# Patient Record
Sex: FEMALE | Race: WHITE | Employment: FULL TIME | URBAN - METROPOLITAN AREA
[De-identification: names, ages, dates, MRNs, and addresses within clinical notes are randomized per-mention and may not be internally consistent; named-entity substitution may affect disease eponyms.]

---

## 2017-03-01 LAB — 25(OH)D3+25(OH)D2 SERPL-MCNC: 25.9 NG/ML (ref 30–100)

## 2018-02-21 ENCOUNTER — OFFICE VISIT (OUTPATIENT)
Dept: INTERNAL MEDICINE CLINIC | Age: 38
End: 2018-02-21

## 2018-02-21 VITALS
WEIGHT: 145.6 LBS | HEIGHT: 66 IN | HEART RATE: 76 BPM | RESPIRATION RATE: 20 BRPM | DIASTOLIC BLOOD PRESSURE: 60 MMHG | BODY MASS INDEX: 23.4 KG/M2 | OXYGEN SATURATION: 98 % | TEMPERATURE: 98.4 F | SYSTOLIC BLOOD PRESSURE: 90 MMHG

## 2018-02-21 DIAGNOSIS — H61.23 BILATERAL IMPACTED CERUMEN: ICD-10-CM

## 2018-02-21 DIAGNOSIS — Z00.00 WELL ADULT EXAM: Primary | ICD-10-CM

## 2018-02-21 DIAGNOSIS — E78.89 ELEVATED HDL: ICD-10-CM

## 2018-02-21 PROBLEM — F32.A MILD DEPRESSION: Status: RESOLVED | Noted: 2018-02-21 | Resolved: 2018-02-21

## 2018-02-21 PROBLEM — F32.A MILD DEPRESSION: Status: ACTIVE | Noted: 2018-02-21

## 2018-02-21 NOTE — PROGRESS NOTES
Comprehensive Physical Examination    Ernst Ferguson is a 40 y.o. female. she presents for a comprehensive physical examination. The patient reports feeling generally well today. She has no acute complaints. On review of her previously performed lab work, she does have very mild elevation of cholesterol. Also, she has some borderline vitamin D levels. Otherwise, she is doing well. Patient Active Problem List    Diagnosis Date Noted    Mild depression ([address] 2018    Active labor at term 2014    Breech presentation 2014     delivery delivered 2014    Hyperlipidemia 2012    Recurrent cold sores     Abnormal Pap smear of cervix      Current Outpatient Prescriptions   Medication Sig Dispense Refill    APRI 0.15-0.03 mg tab TAKE 1 TABLET BY MOUTH EVERY DAY  3    oxycodone-acetaminophen (PERCOCET) 5-325 mg per tablet Take 1-2 tablets by mouth every four (4) hours as needed for Pain. 30 tablet 0    ibuprofen (MOTRIN) 600 mg tablet Take 1 tablet by mouth every six (6) hours as needed for Pain. 30 tablet 6    oxyCODONE-acetaminophen (PERCOCET) 5-325 mg per tablet Take 1-2 Tabs by mouth every four (4) hours as needed for Pain. 15 Tab 0    ibuprofen (MOTRIN) 600 mg tablet Take 1 Tab by mouth every six (6) hours as needed for Pain. 30 Tab 6    prenatal multivit-ca-min-fe-fa Tab Take 1 Tab by mouth daily.        No Known Allergies  Past Medical History:   Diagnosis Date    Abnormal Pap smear of cervix     Colpo, client states normal since     Depression     GERD (gastroesophageal reflux disease)     HPV in female     Hyperlipidemia     high HDL, borderling LDL on previous testing    Recurrent cold sores      Past Surgical History:   Procedure Laterality Date    BREAST SURGERY PROCEDURE UNLISTED      left breast  - lumpectomy (negative)    BREAST SURGERY PROCEDURE UNLISTED  2011    right breast - staple placed    HX GYN  2012 colposcopy    HX GYN  13    D&C - missed     HX HEENT      WISDOM TEETH    HX OTHER SURGICAL      wisdom teeth     HX OTHER SURGICAL      D&C      Family History   Problem Relation Age of Onset    Hypertension Father     Coronary Artery Disease Father 54     stents x2    Cancer Brother 22     tongue    Cancer Maternal Grandmother      late onset breast CA    Hypertension Paternal Grandmother     Malignant Hyperthermia Neg Hx     Pseudocholinesterase Deficiency Neg Hx     Delayed Awakening Neg Hx     Post-op Nausea/Vomiting Neg Hx      Social History   Substance Use Topics    Smoking status: Never Smoker    Smokeless tobacco: Never Used    Alcohol use 3.0 oz/week     5 Glasses of wine per week        Health Maintenance   Topic Date Due    Influenza Age 5 to Adult  2017    PAP AKA CERVICAL CYTOLOGY  2018    DTaP/Tdap/Td series (2 - Td) 2024         Review of Systems   Constitutional: Negative. Respiratory: Negative. Cardiovascular: Negative. Gastrointestinal: Negative. Visit Vitals    BP 90/60 (BP 1 Location: Right arm, BP Patient Position: Sitting)    Pulse 76    Temp 98.4 °F (36.9 °C) (Oral)    Resp 20    Ht 5' 6.18\" (1.681 m)    Wt 145 lb 9.6 oz (66 kg)    LMP 2018    SpO2 98%    BMI 23.37 kg/m2       Physical Exam   Constitutional: No distress. HENT:   Mouth/Throat: No oropharyngeal exudate. Bilateral cerumen impactions   Neck: Normal range of motion. Cardiovascular: Normal rate and regular rhythm. Exam reveals no friction rub. Pulmonary/Chest: Effort normal and breath sounds normal. She has no wheezes. Abdominal: Soft. Bowel sounds are normal.   Lymphadenopathy:     She has no cervical adenopathy. ASSESSMENT/PLAN  Diagnoses and all orders for this visit:    1. Well adult exam    2. Bilateral impacted cerumen  -     REMOVE IMPACTED EAR WAX    3.  Elevated HDL  -     LIPID PANEL  -     METABOLIC PANEL, COMPREHENSIVE        Follow-up Disposition:  Return in about 1 year (around 2/21/2019) for Full Physical - 30 minutes appointment.

## 2018-08-22 ENCOUNTER — OFFICE VISIT (OUTPATIENT)
Dept: FAMILY MEDICINE CLINIC | Age: 38
End: 2018-08-22

## 2018-08-22 VITALS
RESPIRATION RATE: 16 BRPM | HEIGHT: 66 IN | OXYGEN SATURATION: 100 % | BODY MASS INDEX: 25.58 KG/M2 | SYSTOLIC BLOOD PRESSURE: 128 MMHG | DIASTOLIC BLOOD PRESSURE: 82 MMHG | WEIGHT: 159.2 LBS | TEMPERATURE: 98.9 F | HEART RATE: 68 BPM

## 2018-08-22 DIAGNOSIS — H61.23 BILATERAL IMPACTED CERUMEN: Primary | ICD-10-CM

## 2018-08-22 NOTE — PROGRESS NOTES
Subjective:     Brigitte Lezama is a 41 y.o. female who presents for evaluation of a plugged ear. She has noticed bilateral symptoms 1 week ago. There is a prior history of cerumen impaction. Patient denies ear pain. Patient denies hearing loss. Objective:     Visit Vitals    /82 (BP 1 Location: Left arm, BP Patient Position: Sitting)    Pulse 68    Temp 98.9 °F (37.2 °C) (Oral)    Resp 16    Ht 5' 6.18\" (1.681 m)    Wt 159 lb 3.2 oz (72.2 kg)    LMP 08/22/2018 (Exact Date)    SpO2 100%    Breastfeeding No    BMI 25.56 kg/m2       General: alert, cooperative, no distress   Right Ear: ceruminosis noted. Left Ear:  ceruminosis noted. After removal: right ear normal, ceruminosis noted, ranjana on the left. Oropharynx:   normal.   Neck:  supple, symmetrical, trachea midline and no adenopathy. Assessment/Plan:     Cerumen Impaction, without otitis externa. 1. Cerumen removed by flushing. 2. Care instructions given. 3. Home treatment: Debrox bid  4. Followup as needed. ICD-10-CM ICD-9-CM    1. Bilateral impacted cerumen H61.23 380.4 REMOVE IMPACTED EAR WAX   .

## 2018-08-22 NOTE — PROGRESS NOTES
Chief Complaint   Patient presents with    Ear Pain     Bilateral ear pain for a week. Now with a headache and feels cloudy. Flushed in March     Bilateral ear flushes per Dr. Kimbrouhg Portal. Tolerated well.

## 2018-11-01 ENCOUNTER — OFFICE VISIT (OUTPATIENT)
Dept: FAMILY MEDICINE CLINIC | Age: 38
End: 2018-11-01

## 2018-11-01 VITALS
BODY MASS INDEX: 23.91 KG/M2 | HEART RATE: 90 BPM | DIASTOLIC BLOOD PRESSURE: 83 MMHG | RESPIRATION RATE: 16 BRPM | TEMPERATURE: 97.3 F | HEIGHT: 66 IN | OXYGEN SATURATION: 99 % | WEIGHT: 148.8 LBS | SYSTOLIC BLOOD PRESSURE: 129 MMHG

## 2018-11-01 DIAGNOSIS — Z23 ENCOUNTER FOR IMMUNIZATION: ICD-10-CM

## 2018-11-01 DIAGNOSIS — J11.1 INFLUENZA: ICD-10-CM

## 2018-11-01 DIAGNOSIS — J02.9 SORE THROAT: Primary | ICD-10-CM

## 2018-11-01 LAB
S PYO AG THROAT QL: NEGATIVE
VALID INTERNAL CONTROL?: YES

## 2018-11-01 NOTE — PROGRESS NOTES
Chief Complaint   Patient presents with    Cold Symptoms     Cough and sore throat offf and on for a week. No fever or feeling bad, traveling alot on a plane.     Immunization/Injection     Patient here for placement of Influenza vaccine

## 2018-11-01 NOTE — PROGRESS NOTES
HISTORY OF PRESENT ILLNESS  Jeremiah Espino is a 41 y.o. female. Patient reports intermittent sore throat for 3 weeks. She started with sore throat, which progressed to cough and yellow congestion. This improved, but the dry cough and sore throat has lingered. It seems worse at night and first thing in the morning. No fever. Congestion is clear now. She is not taking anything for allergies at this time. Visit Vitals  /83 (BP 1 Location: Left arm, BP Patient Position: Sitting)   Pulse 90   Temp 97.3 °F (36.3 °C) (Oral)   Resp 16   Ht 5' 6.18\" (1.681 m)   Wt 148 lb 12.8 oz (67.5 kg)   LMP 10/17/2018   SpO2 99%   BMI 23.89 kg/m²       HPI    Review of Systems   HENT: Positive for sore throat. Respiratory: Positive for cough. Physical Exam   Constitutional: She is oriented to person, place, and time. She appears well-developed and well-nourished. HENT:   Head: Normocephalic. Right Ear: Hearing, tympanic membrane, external ear and ear canal normal.   Left Ear: Hearing, tympanic membrane, external ear and ear canal normal.   Nose: Mucosal edema present. Mouth/Throat: Uvula is midline and mucous membranes are normal. Posterior oropharyngeal erythema (postpharyngeal cobblestoning and redness) present. Neck: Normal range of motion. Neck supple. Cardiovascular: Normal rate, regular rhythm and normal heart sounds. Pulmonary/Chest: Effort normal and breath sounds normal.   Lymphadenopathy:     She has cervical adenopathy (mild). Neurological: She is alert and oriented to person, place, and time. Skin: Skin is warm and dry. Psychiatric: She has a normal mood and affect. Results for orders placed or performed in visit on 11/01/18   AMB POC RAPID STREP A   Result Value Ref Range    VALID INTERNAL CONTROL POC Yes     Group A Strep Ag Negative Negative       ASSESSMENT and PLAN    ICD-10-CM ICD-9-CM    1. Sore throat J02.9 462 AMB POC RAPID STREP A   2. Influenza J11.1 487.1    3.  Encounter for immunization Z23 V03.89 INFLUENZA VIRUS VAC QUAD,SPLIT,PRESV FREE SYRINGE IM     Orders Placed This Encounter    Influenza virus vaccine (QUADRIVALENT PF SYRINGE) ([id number])    AMB POC RAPID STREP A   zyrtec and flonase

## 2019-06-06 ENCOUNTER — OFFICE VISIT (OUTPATIENT)
Dept: INTERNAL MEDICINE CLINIC | Age: 39
End: 2019-06-06

## 2019-06-06 VITALS
WEIGHT: 148 LBS | TEMPERATURE: 98.5 F | DIASTOLIC BLOOD PRESSURE: 88 MMHG | HEART RATE: 83 BPM | SYSTOLIC BLOOD PRESSURE: 110 MMHG | HEIGHT: 66 IN | RESPIRATION RATE: 19 BRPM | BODY MASS INDEX: 23.78 KG/M2 | OXYGEN SATURATION: 97 %

## 2019-06-06 DIAGNOSIS — Z00.00 WELL ADULT EXAM: Primary | ICD-10-CM

## 2019-06-06 NOTE — PROGRESS NOTES
Comprehensive Physical Examination    Carroll Posada is a 41 y.o. female. she presents for a comprehensive physical examination. No complaints. She feels well. She has been exercising and eating better. She still has some issues with earwax. Discussed the use of Debrox. She will continue this.       Patient Active Problem List    Diagnosis Date Noted    Bilateral impacted cerumen 2018    Active labor at term 2014    Breech presentation 2014     delivery delivered 2014    Hyperlipidemia 2012    Recurrent cold sores     Abnormal Pap smear of cervix      Current Outpatient Medications   Medication Sig Dispense Refill    APRI 0.15-0.03 mg tab TAKE 1 TABLET BY MOUTH EVERY DAY  3     No Known Allergies  Past Medical History:   Diagnosis Date    Abnormal Pap smear of cervix     Colpo, client states normal since     Depression     GERD (gastroesophageal reflux disease)     HPV in female     Hyperlipidemia     high HDL, borderling LDL on previous testing    Recurrent cold sores      Past Surgical History:   Procedure Laterality Date    BREAST SURGERY PROCEDURE UNLISTED      left breast  - lumpectomy (negative)    BREAST SURGERY PROCEDURE UNLISTED  2011    right breast - staple placed    HX GYN  2012    colposcopy    HX GYN  13    D&C - missed     HX HEENT      WISDOM TEETH    HX OTHER SURGICAL      wisdom teeth     HX OTHER SURGICAL      D&C      Family History   Problem Relation Age of Onset    Hypertension Father     Coronary Artery Disease Father 54        stents x2    Cancer Brother 25        tongue    Cancer Maternal Grandmother         late onset breast CA    Hypertension Paternal Grandmother     Malignant Hyperthermia Neg Hx     Pseudocholinesterase Deficiency Neg Hx     Delayed Awakening Neg Hx     Post-op Nausea/Vomiting Neg Hx      Social History     Tobacco Use    Smoking status: Never Smoker  Smokeless tobacco: Never Used   Substance Use Topics    Alcohol use: Yes     Alcohol/week: 3.0 oz     Types: 5 Glasses of wine per week        Health Maintenance   Topic Date Due    Influenza Age 5 to Adult  08/01/2019    PAP AKA CERVICAL CYTOLOGY  03/08/2020    DTaP/Tdap/Td series (2 - Td) 01/01/2024    Pneumococcal 0-64 years  Aged Out         Review of Systems   Constitutional: Negative. Respiratory: Negative. Cardiovascular: Negative. Gastrointestinal: Negative. Visit Vitals  /88 (BP 1 Location: Right arm, BP Patient Position: Sitting)   Pulse 83   Temp 98.5 °F (36.9 °C) (Oral)   Resp 19   Ht 5' 6.18\" (1.681 m)   Wt 148 lb (67.1 kg)   LMP 05/30/2019   SpO2 97%   BMI 23.76 kg/m²       Physical Exam   Constitutional: She is oriented to person, place, and time and well-developed, well-nourished, and in no distress. No distress. HENT:   Mouth/Throat: Oropharynx is clear and moist.   Neck: Neck supple. Cardiovascular: Normal rate and regular rhythm. Pulmonary/Chest: Effort normal and breath sounds normal. She has no wheezes. Abdominal: Soft. Bowel sounds are normal.   Musculoskeletal: Normal range of motion. Neurological: She is alert and oriented to person, place, and time. ASSESSMENT/PLAN  Diagnoses and all orders for this visit:    1. Well adult exam  -     CBC WITH AUTOMATED DIFF  -     LIPID PANEL  -     METABOLIC PANEL, COMPREHENSIVE  -     TSH 3RD GENERATION        Follow-up and Dispositions    · Return in about 1 year (around 6/6/2020) for Full Physical - 30 minutes appointment.

## 2019-07-01 ENCOUNTER — TELEPHONE (OUTPATIENT)
Dept: INTERNAL MEDICINE CLINIC | Age: 39
End: 2019-07-01

## 2019-07-01 NOTE — TELEPHONE ENCOUNTER
Spoke with patient, stitches in pinky finger on Saturday [facility]. Throbbing, feels a little numb and tingling. Confirmed appt 07/02/19 @ 8:15. Pt verbalized understanding.

## 2019-07-02 ENCOUNTER — DOCUMENTATION ONLY (OUTPATIENT)
Dept: INTERNAL MEDICINE CLINIC | Age: 39
End: 2019-07-02

## 2019-07-02 ENCOUNTER — OFFICE VISIT (OUTPATIENT)
Dept: INTERNAL MEDICINE CLINIC | Age: 39
End: 2019-07-02

## 2019-07-02 VITALS
OXYGEN SATURATION: 99 % | WEIGHT: 149 LBS | HEART RATE: 76 BPM | RESPIRATION RATE: 19 BRPM | DIASTOLIC BLOOD PRESSURE: 80 MMHG | HEIGHT: 66 IN | TEMPERATURE: 98.1 F | BODY MASS INDEX: 23.95 KG/M2 | SYSTOLIC BLOOD PRESSURE: 110 MMHG

## 2019-07-02 DIAGNOSIS — S69.91XA FINGER INJURY, RIGHT, INITIAL ENCOUNTER: Primary | ICD-10-CM

## 2019-07-02 NOTE — PROGRESS NOTES
Pt scheduled with Dr. Joe Singh on 7/2/19 @ 3 pm in the [facility] office. Time and instructions given to pt. Pt verbalized understanding.

## 2019-07-02 NOTE — PROGRESS NOTES
Acute Care Note    Daune Councilman is 41 y.o. female. she presents for evaluation of Wound Check    The patient comes for evaluation after he had seen in the emergency room on . She had broken a mild can cut her fifth digit on the palmar surface near the bend of the proximal interphalangeal joint. She had the area stitched at the time and was discharged home. Since that time, the patient has had ongoing numbness of the fifth digit. She is also had difficulty bending and even moving the digit. She presents with concern of this injury. Prior to Admission medications    Medication Sig Start Date End Date Taking? Authorizing Provider   APRI 0.15-0.03 mg tab TAKE 1 TABLET BY MOUTH EVERY DAY 16  Yes Provider, Historical         Patient Active Problem List   Diagnosis Code    Recurrent cold sores B00.1    Abnormal Pap smear of cervix R87.619    Hyperlipidemia E78.5    Active labor at term [address] presentation O32. 1XX0     delivery delivered O82    Bilateral impacted cerumen H61.23         Review of Systems   Constitutional: Negative. Cardiovascular: Negative. Neurological: Positive for sensory change. Visit Vitals  /80 (BP 1 Location: Right arm, BP Patient Position: Sitting)   Pulse 76   Temp 98.1 °F (36.7 °C) (Oral)   Resp 19   Ht 5' 6.18\" (1.681 m)   Wt 149 lb (67.6 kg)   LMP 2019   SpO2 99%   BMI 23.92 kg/m²       Physical Exam   Constitutional: No distress. Musculoskeletal:   Swelling of the right fifth digit. Sutures are in place with dried serous drainage surrounding. The patient is unable to move the digit more than a very slight bend. She also has tingling present at the distal portion of the finger with numbness associated. ASSESSMENT/PLAN  Diagnoses and all orders for this visit:    1.  Finger injury, right, initial encounter -discussed with the patient, given the extent of her loss of function, I am concerned regarding the possibility of a tendon injury versus nerve injury to the finger. I am referring her immediately to a hand surgeon. I will ask her to be seen today for this. The patient endorses understanding. I referred to Dr. Art Chao at [facility] orthopedics. Advised the patient to call back or return to office if symptoms worsen/change/persist.   Discussed expected course/resolution/complications of diagnosis in detail with patient. Medication risks/benefits/costs/interactions/alternatives discussed with patient. The patient was given an after visit summary which includes diagnoses, current medications, & vitals. They expressed understanding with the diagnosis and plan.

## 2019-07-03 LAB
ALBUMIN SERPL-MCNC: 4.2 G/DL (ref 3.5–5.5)
ALBUMIN/GLOB SERPL: 1.5 {RATIO} (ref 1.2–2.2)
ALP SERPL-CCNC: 35 IU/L (ref 39–117)
ALT SERPL-CCNC: 11 IU/L (ref 0–32)
AST SERPL-CCNC: 11 IU/L (ref 0–40)
BASOPHILS # BLD AUTO: 0 X10E3/UL (ref 0–0.2)
BASOPHILS NFR BLD AUTO: 1 %
BILIRUB SERPL-MCNC: 0.4 MG/DL (ref 0–1.2)
BUN SERPL-MCNC: 14 MG/DL (ref 6–20)
BUN/CREAT SERPL: 19 (ref 9–23)
CALCIUM SERPL-MCNC: 9.3 MG/DL (ref 8.7–10.2)
CHLORIDE SERPL-SCNC: 101 MMOL/L (ref 96–106)
CHOLEST SERPL-MCNC: 247 MG/DL (ref 100–199)
CO2 SERPL-SCNC: 25 MMOL/L (ref 20–29)
CREAT SERPL-MCNC: 0.75 MG/DL (ref 0.57–1)
EOSINOPHIL # BLD AUTO: 0.1 X10E3/UL (ref 0–0.4)
EOSINOPHIL NFR BLD AUTO: 2 %
ERYTHROCYTE [DISTWIDTH] IN BLOOD BY AUTOMATED COUNT: 11.8 % (ref 12.3–15.4)
GLOBULIN SER CALC-MCNC: 2.8 G/DL (ref 1.5–4.5)
GLUCOSE SERPL-MCNC: 90 MG/DL (ref 65–99)
HCT VFR BLD AUTO: 40.4 % (ref 34–46.6)
HDLC SERPL-MCNC: 116 MG/DL
HGB BLD-MCNC: 13.1 G/DL (ref 11.1–15.9)
IMM GRANULOCYTES # BLD AUTO: 0 X10E3/UL (ref 0–0.1)
IMM GRANULOCYTES NFR BLD AUTO: 0 %
LDLC SERPL CALC-MCNC: 109 MG/DL (ref 0–99)
LYMPHOCYTES # BLD AUTO: 1.6 X10E3/UL (ref 0.7–3.1)
LYMPHOCYTES NFR BLD AUTO: 26 %
MCH RBC QN AUTO: 29.2 PG (ref 26.6–33)
MCHC RBC AUTO-ENTMCNC: 32.4 G/DL (ref 31.5–35.7)
MCV RBC AUTO: 90 FL (ref 79–97)
MONOCYTES # BLD AUTO: 0.4 X10E3/UL (ref 0.1–0.9)
MONOCYTES NFR BLD AUTO: 7 %
NEUTROPHILS # BLD AUTO: 4 X10E3/UL (ref 1.4–7)
NEUTROPHILS NFR BLD AUTO: 64 %
PLATELET # BLD AUTO: 389 X10E3/UL (ref 150–450)
POTASSIUM SERPL-SCNC: 4.1 MMOL/L (ref 3.5–5.2)
PROT SERPL-MCNC: 7 G/DL (ref 6–8.5)
RBC # BLD AUTO: 4.49 X10E6/UL (ref 3.77–5.28)
SODIUM SERPL-SCNC: 141 MMOL/L (ref 134–144)
TRIGL SERPL-MCNC: 108 MG/DL (ref 0–149)
TSH SERPL DL<=0.005 MIU/L-ACNC: 1.89 UIU/ML (ref 0.45–4.5)
VLDLC SERPL CALC-MCNC: 22 MG/DL (ref 5–40)
WBC # BLD AUTO: 6.1 X10E3/UL (ref 3.4–10.8)

## 2020-10-15 ENCOUNTER — OFFICE VISIT (OUTPATIENT)
Dept: INTERNAL MEDICINE CLINIC | Age: 40
End: 2020-10-15
Payer: COMMERCIAL

## 2020-10-15 VITALS
WEIGHT: 148.6 LBS | SYSTOLIC BLOOD PRESSURE: 122 MMHG | TEMPERATURE: 97.6 F | OXYGEN SATURATION: 100 % | BODY MASS INDEX: 23.88 KG/M2 | DIASTOLIC BLOOD PRESSURE: 70 MMHG | HEIGHT: 66 IN | RESPIRATION RATE: 14 BRPM | HEART RATE: 93 BPM

## 2020-10-15 DIAGNOSIS — E78.00 PURE HYPERCHOLESTEROLEMIA: ICD-10-CM

## 2020-10-15 DIAGNOSIS — Z00.00 WELL ADULT EXAM: Primary | ICD-10-CM

## 2020-10-15 PROCEDURE — 99395 PREV VISIT EST AGE 18-39: CPT | Performed by: INTERNAL MEDICINE

## 2020-10-15 NOTE — PROGRESS NOTES
Comprehensive Physical Examination    Balbir Parish is a 45 y.o. female. she presents for a comprehensive physical examination. Feeling well, no complaints. She has seen gynecology and dermatology. No issues noted.       She has had her flu shot     Patient Active Problem List    Diagnosis Date Noted    Bilateral impacted cerumen 2018    Active labor at term 2014    Breech presentation 2014     delivery delivered 2014    Hyperlipidemia 2012    Recurrent cold sores     Abnormal Pap smear of cervix      Current Outpatient Medications   Medication Sig Dispense Refill    APRI 0.15-0.03 mg tab TAKE 1 TABLET BY MOUTH EVERY DAY  3     No Known Allergies  Past Medical History:   Diagnosis Date    Abnormal Pap smear of cervix     Colpo, client states normal since     Depression     GERD (gastroesophageal reflux disease)     HPV in female     Hyperlipidemia     high HDL, borderling LDL on previous testing    Recurrent cold sores      Past Surgical History:   Procedure Laterality Date    BREAST SURGERY PROCEDURE UNLISTED      left breast  - lumpectomy (negative)    BREAST SURGERY PROCEDURE UNLISTED  2011    right breast - staple placed    HX GYN  2012    colposcopy    HX GYN  13    D&C - missed     HX HEENT      WISDOM TEETH    HX OTHER SURGICAL      wisdom teeth     HX OTHER SURGICAL      D&C      Family History   Problem Relation Age of Onset    Hypertension Father     Coronary Artery Disease Father 54        stents x2    Cancer Brother 25        tongue    Cancer Maternal Grandmother         late onset breast CA    Hypertension Paternal Grandmother     Malignant Hyperthermia Neg Hx     Pseudocholinesterase Deficiency Neg Hx     Delayed Awakening Neg Hx     Post-op Nausea/Vomiting Neg Hx      Social History     Tobacco Use    Smoking status: Never Smoker    Smokeless tobacco: Never Used   Substance Use Topics  Alcohol use: Yes     Alcohol/week: 5.0 standard drinks     Types: 5 Glasses of wine per week        Health Maintenance   Topic Date Due    PAP AKA CERVICAL CYTOLOGY  03/08/2020    Flu Vaccine (1) 09/01/2020    DTaP/Tdap/Td series (2 - Td) 01/01/2024    Pneumococcal 0-64 years  Aged Out         Review of Systems   Constitutional: Negative. Respiratory: Negative. Cardiovascular: Negative. Gastrointestinal: Negative. Visit Vitals  /70 (BP 1 Location: Left arm, BP Patient Position: Sitting)   Pulse 93   Temp 97.6 °F (36.4 °C) (Temporal)   Resp 14   Ht 5' 6.18\" (1.681 m)   Wt 148 lb 9.6 oz (67.4 kg)   SpO2 100%   BMI 23.85 kg/m²       Physical Exam  Constitutional:       Appearance: Normal appearance. Cardiovascular:      Rate and Rhythm: Normal rate and regular rhythm. Pulses: Normal pulses. Heart sounds: Normal heart sounds. Pulmonary:      Effort: Pulmonary effort is normal.      Breath sounds: Normal breath sounds. Neurological:      Mental Status: She is alert. ASSESSMENT/PLAN  Diagnoses and all orders for this visit:    1. Well adult exam  -     CBC WITH AUTOMATED DIFF  -     METABOLIC PANEL, COMPREHENSIVE  -     TSH 3RD GENERATION    2. Pure hypercholesterolemia  -     LIPID PANEL      Follow-up and Dispositions    · Return in about 1 year (around 10/15/2021).

## 2020-10-15 NOTE — PROGRESS NOTES
Chief Complaint   Patient presents with    Complete Physical     Reviewed record in preparation for visit and have obtained necessary documentation. Identified pt with two pt identifiers(name and ). Health Maintenance Due   Topic    PAP AKA CERVICAL CYTOLOGY     Flu Vaccine (1)         Chief Complaint   Patient presents with    Complete Physical        Wt Readings from Last 3 Encounters:   10/15/20 148 lb 9.6 oz (67.4 kg)   19 149 lb (67.6 kg)   19 148 lb (67.1 kg)     Temp Readings from Last 3 Encounters:   10/15/20 97.6 °F (36.4 °C) (Temporal)   19 98.1 °F (36.7 °C) (Oral)   19 98.5 °F (36.9 °C) (Oral)     BP Readings from Last 3 Encounters:   10/15/20 122/70   19 110/80   19 110/88     Pulse Readings from Last 3 Encounters:   10/15/20 93   19 76   19 83           Learning Assessment:  :     Learning Assessment 2018   PRIMARY LEARNER Patient   HIGHEST LEVEL OF EDUCATION - PRIMARY LEARNER  4 YEARS OF COLLEGE   BARRIERS PRIMARY LEARNER NONE   PRIMARY LANGUAGE ENGLISH   LEARNER PREFERENCE PRIMARY DEMONSTRATION   ANSWERED BY Patient   RELATIONSHIP SELF       Depression Screening:  :     3 most recent PHQ Screens 2019   Little interest or pleasure in doing things Not at all   Feeling down, depressed, irritable, or hopeless Not at all   Total Score PHQ 2 0       Fall Risk Assessment:  :     No flowsheet data found. Abuse Screening:  :     Abuse Screening Questionnaire 2018   Do you ever feel afraid of your partner? N   Are you in a relationship with someone who physically or mentally threatens you? N   Is it safe for you to go home?  Y       Coordination of Care Questionnaire:  :     1) Have you been to an emergency room, urgent care clinic since your last visit? no   Hospitalized since your last visit? no             2) Have you seen or consulted any other health care providers outside of [address] [place] [address] since your last visit? no (Include any pap smears or colon screenings in this section.)    3) Do you have an Advance Directive on file? no    4) Are you interested in receiving information on Advance Directives? NO      Patient is accompanied by self I have received verbal consent from Trey Bennett to discuss any/all medical information while they are present in the room. Reviewed record  In preparation for visit and have obtained necessary documentation.

## 2020-12-30 LAB
ALBUMIN SERPL-MCNC: 4.2 G/DL (ref 3.8–4.8)
ALBUMIN/GLOB SERPL: 1.4 {RATIO} (ref 1.2–2.2)
ALP SERPL-CCNC: 37 IU/L (ref 39–117)
ALT SERPL-CCNC: 11 IU/L (ref 0–32)
AST SERPL-CCNC: 13 IU/L (ref 0–40)
BASOPHILS # BLD AUTO: 0 X10E3/UL (ref 0–0.2)
BASOPHILS NFR BLD AUTO: 1 %
BILIRUB SERPL-MCNC: 0.3 MG/DL (ref 0–1.2)
BUN SERPL-MCNC: 14 MG/DL (ref 6–20)
BUN/CREAT SERPL: 18 (ref 9–23)
CALCIUM SERPL-MCNC: 9.3 MG/DL (ref 8.7–10.2)
CHLORIDE SERPL-SCNC: 101 MMOL/L (ref 96–106)
CHOLEST SERPL-MCNC: 265 MG/DL (ref 100–199)
CO2 SERPL-SCNC: 24 MMOL/L (ref 20–29)
CREAT SERPL-MCNC: 0.76 MG/DL (ref 0.57–1)
EOSINOPHIL # BLD AUTO: 0.1 X10E3/UL (ref 0–0.4)
EOSINOPHIL NFR BLD AUTO: 1 %
ERYTHROCYTE [DISTWIDTH] IN BLOOD BY AUTOMATED COUNT: 11.8 % (ref 11.7–15.4)
GLOBULIN SER CALC-MCNC: 2.9 G/DL (ref 1.5–4.5)
GLUCOSE SERPL-MCNC: 96 MG/DL (ref 65–99)
HCT VFR BLD AUTO: 40.7 % (ref 34–46.6)
HDLC SERPL-MCNC: 116 MG/DL
HGB BLD-MCNC: 13 G/DL (ref 11.1–15.9)
IMM GRANULOCYTES # BLD AUTO: 0 X10E3/UL (ref 0–0.1)
IMM GRANULOCYTES NFR BLD AUTO: 0 %
LDLC SERPL CALC-MCNC: 131 MG/DL (ref 0–99)
LYMPHOCYTES # BLD AUTO: 1.5 X10E3/UL (ref 0.7–3.1)
LYMPHOCYTES NFR BLD AUTO: 26 %
MCH RBC QN AUTO: 29.5 PG (ref 26.6–33)
MCHC RBC AUTO-ENTMCNC: 31.9 G/DL (ref 31.5–35.7)
MCV RBC AUTO: 92 FL (ref 79–97)
MONOCYTES # BLD AUTO: 0.5 X10E3/UL (ref 0.1–0.9)
MONOCYTES NFR BLD AUTO: 8 %
NEUTROPHILS # BLD AUTO: 3.7 X10E3/UL (ref 1.4–7)
NEUTROPHILS NFR BLD AUTO: 64 %
PLATELET # BLD AUTO: 383 X10E3/UL (ref 150–450)
POTASSIUM SERPL-SCNC: 3.9 MMOL/L (ref 3.5–5.2)
PROT SERPL-MCNC: 7.1 G/DL (ref 6–8.5)
RBC # BLD AUTO: 4.41 X10E6/UL (ref 3.77–5.28)
SODIUM SERPL-SCNC: 138 MMOL/L (ref 134–144)
TRIGL SERPL-MCNC: 110 MG/DL (ref 0–149)
TSH SERPL DL<=0.005 MIU/L-ACNC: 2.24 UIU/ML (ref 0.45–4.5)
VLDLC SERPL CALC-MCNC: 18 MG/DL (ref 5–40)
WBC # BLD AUTO: 5.8 X10E3/UL (ref 3.4–10.8)

## 2020-12-31 ENCOUNTER — OFFICE VISIT (OUTPATIENT)
Dept: INTERNAL MEDICINE CLINIC | Age: 40
End: 2020-12-31
Payer: COMMERCIAL

## 2020-12-31 ENCOUNTER — TELEPHONE (OUTPATIENT)
Dept: INTERNAL MEDICINE CLINIC | Age: 40
End: 2020-12-31

## 2020-12-31 VITALS
BODY MASS INDEX: 23.98 KG/M2 | DIASTOLIC BLOOD PRESSURE: 84 MMHG | SYSTOLIC BLOOD PRESSURE: 132 MMHG | HEART RATE: 89 BPM | RESPIRATION RATE: 20 BRPM | HEIGHT: 66 IN | OXYGEN SATURATION: 98 % | WEIGHT: 149.2 LBS | TEMPERATURE: 97.3 F

## 2020-12-31 DIAGNOSIS — B02.9 HERPES ZOSTER WITHOUT COMPLICATION: Primary | ICD-10-CM

## 2020-12-31 PROCEDURE — 99213 OFFICE O/P EST LOW 20 MIN: CPT | Performed by: INTERNAL MEDICINE

## 2020-12-31 NOTE — PROGRESS NOTES
HISTORY OF PRESENT ILLNESS  Bret Schrader is a 45 y.o. female. Rash   The history is provided by the patient (went to [facility], dx with shingles, given acyclovir. She's concerned about the dx). This is a new problem. The current episode started 2 days ago. The problem has not changed since onset. The problem is associated with an unknown factor. There has been no fever. The rash is present on the left upper leg. The pain is mild. The pain has been fluctuating since onset. Associated symptoms include itching and pain. Pertinent negatives include no blisters and no weeping. Risk factors: none. She has tried steroid cream for the symptoms. The treatment provided no relief. Review of Systems   Constitutional: Negative for chills, fever and malaise/fatigue. Musculoskeletal: Negative for myalgias. Skin: Positive for itching and rash. Physical Exam  Vitals signs and nursing note reviewed. Constitutional:       General: She is not in acute distress. Appearance: She is not ill-appearing. Skin:     General: Skin is warm and dry. Neurological:      Mental Status: She is alert. Psychiatric:         Behavior: Behavior normal.         ASSESSMENT and PLAN  Diagnoses and all orders for this visit:    1.  Herpes zoster without complication  - Continue current regimen of prescription and / or OTC medications , may use OTC pain meds if needed

## 2021-01-21 ENCOUNTER — TELEPHONE (OUTPATIENT)
Dept: INTERNAL MEDICINE CLINIC | Age: 41
End: 2021-01-21

## 2021-01-21 NOTE — TELEPHONE ENCOUNTER
Patient has sent a [employer] message 3 different times and has not gotten a response. Wants to know if she needs to come in for additional testing. Please respond.

## 2021-12-01 ENCOUNTER — OFFICE VISIT (OUTPATIENT)
Dept: INTERNAL MEDICINE CLINIC | Age: 41
End: 2021-12-01
Payer: COMMERCIAL

## 2021-12-01 VITALS
HEART RATE: 81 BPM | WEIGHT: 143 LBS | TEMPERATURE: 97.1 F | DIASTOLIC BLOOD PRESSURE: 90 MMHG | HEIGHT: 66 IN | OXYGEN SATURATION: 99 % | SYSTOLIC BLOOD PRESSURE: 145 MMHG | RESPIRATION RATE: 16 BRPM | BODY MASS INDEX: 22.98 KG/M2

## 2021-12-01 DIAGNOSIS — Z00.00 WELL ADULT EXAM: Primary | ICD-10-CM

## 2021-12-01 DIAGNOSIS — R03.0 ELEVATED BLOOD PRESSURE READING: ICD-10-CM

## 2021-12-01 DIAGNOSIS — F32.9 REACTIVE DEPRESSION: ICD-10-CM

## 2021-12-01 PROCEDURE — 93000 ELECTROCARDIOGRAM COMPLETE: CPT | Performed by: INTERNAL MEDICINE

## 2021-12-01 PROCEDURE — 99396 PREV VISIT EST AGE 40-64: CPT | Performed by: INTERNAL MEDICINE

## 2021-12-01 RX ORDER — AMLODIPINE BESYLATE 5 MG/1
5 TABLET ORAL DAILY
Qty: 30 TABLET | Refills: 3 | Status: SHIPPED | OUTPATIENT
Start: 2021-12-01 | End: 2022-02-15

## 2021-12-01 NOTE — PROGRESS NOTES
Comprehensive Physical Examination    John Au is a 37 y.o. female. she presents for a comprehensive physical examination. Elevated BP, no chest pain. Feeling well. She has noted some elevated blood pressure for the past 6 months. Noted at recurrent [facility] visits. She exercises regularly and notes having a good diet low in sodium. She does have a history of hypertension and heart disease in her family. She has had no other symptoms. Of note, she does have some anxiety/depression regarding her home situation. She has a step son from whom she and her  are currently estranged. She notes that this is upsetting to her and that she would like to have a time to talk about it with a counselor. Referral will be made today.          Patient Active Problem List    Diagnosis Date Noted    Bilateral impacted cerumen 2018    Active labor at term 2014    Breech presentation 2014     delivery delivered 2014    Hyperlipidemia 2012    Recurrent cold sores     Abnormal Pap smear of cervix      Current Outpatient Medications   Medication Sig Dispense Refill    APRI 0.15-0.03 mg tab TAKE 1 TABLET BY MOUTH EVERY DAY  3     No Known Allergies  Past Medical History:   Diagnosis Date    Abnormal Pap smear of cervix     Colpo, client states normal since     Depression     GERD (gastroesophageal reflux disease)     HPV in female     Hyperlipidemia     high HDL, borderling LDL on previous testing    Recurrent cold sores      Past Surgical History:   Procedure Laterality Date    HX GYN  2012    colposcopy    HX GYN  13    D&C - missed     HX HEENT      WISDOM TEETH    HX OTHER SURGICAL      wisdom teeth     HX OTHER SURGICAL      D&C     MT BREAST SURGERY PROCEDURE UNLISTED      left breast  - lumpectomy (negative)    MT BREAST SURGERY PROCEDURE UNLISTED  2011    right breast - staple placed     Family History Problem Relation Age of Onset    Hypertension Father     Coronary Art Dis Father 54        stents x2    Cancer Brother 22        tongue    Cancer Maternal Grandmother         late onset breast CA    Hypertension Paternal Grandmother     Malignant Hyperthermia Neg Hx     Pseudocholinesterase Deficiency Neg Hx     Delayed Awakening Neg Hx     Post-op Nausea/Vomiting Neg Hx      Social History     Tobacco Use    Smoking status: Never Smoker    Smokeless tobacco: Never Used   Substance Use Topics    Alcohol use: Yes     Alcohol/week: 5.0 standard drinks     Types: 5 Glasses of wine per week        Health Maintenance   Topic Date Due    Hepatitis C Screening  Never done    COVID-19 Vaccine (1) Never done    Cervical cancer screen  Never done    Flu Vaccine (1) 09/01/2021    DTaP/Tdap/Td series (2 - Td or Tdap) 01/01/2024    Lipid Screen  12/29/2025    Pneumococcal 0-64 years  Aged Out         Review of Systems   Constitutional: Negative. Respiratory: Negative. Cardiovascular: Negative. Gastrointestinal: Negative. Visit Vitals  BP (!) 145/90   Pulse 81   Temp 97.1 °F (36.2 °C) (Temporal)   Resp 16   Ht 5' 6\" (1.676 m)   Wt 143 lb (64.9 kg)   LMP 11/08/2021   SpO2 99%   BMI 23.08 kg/m²       Physical Exam  Constitutional:       General: She is not in acute distress. Cardiovascular:      Rate and Rhythm: Normal rate and regular rhythm. Pulmonary:      Effort: Pulmonary effort is normal.      Breath sounds: Normal breath sounds. No wheezing. Abdominal:      General: Bowel sounds are normal.      Palpations: Abdomen is soft. Musculoskeletal:         General: Normal range of motion. Cervical back: Neck supple. Neurological:      Mental Status: She is alert and oriented to person, place, and time. ASSESSMENT/PLAN  Diagnoses and all orders for this visit:    1. Well adult exam    2.  Elevated blood pressure reading - We will check labs today, follow up with pressures at home. Advised she also begin amlodipine. She will do so today. -     AMB POC EKG ROUTINE W/ 12 LEADS, INTER & REP  -     LIPID PANEL; Future  -     CBC WITH AUTOMATED DIFF; Future  -     TSH 3RD GENERATION; Future  -     METABOLIC PANEL, COMPREHENSIVE; Future  -     amLODIPine (NORVASC) 5 mg tablet; Take 1 Tablet by mouth daily. 3. Reactive depression  -     REFERRAL TO PSYCHOLOGY    Follow-up and Dispositions    · Return in about 1 month (around 1/1/2022).

## 2021-12-01 NOTE — PROGRESS NOTES
Verified name and birth date for privacy precautions. Chart reviewed in preparation for today's visit. Chief Complaint   Patient presents with    Complete Physical          Health Maintenance Due   Topic    Hepatitis C Screening     COVID-19 Vaccine (1)    Cervical cancer screen     Flu Vaccine (1)         Wt Readings from Last 3 Encounters:   12/01/21 143 lb (64.9 kg)   12/31/20 149 lb 3.2 oz (67.7 kg)   10/15/20 148 lb 9.6 oz (67.4 kg)     Temp Readings from Last 3 Encounters:   12/01/21 97.1 °F (36.2 °C) (Temporal)   12/31/20 97.3 °F (36.3 °C) (Temporal)   10/15/20 97.6 °F (36.4 °C) (Temporal)     BP Readings from Last 3 Encounters:   12/01/21 (!) 149/104   12/31/20 132/84   10/15/20 122/70     Pulse Readings from Last 3 Encounters:   12/01/21 81   12/31/20 89   10/15/20 93         Learning Assessment:  :     Learning Assessment 8/22/2018   PRIMARY LEARNER Patient   HIGHEST LEVEL OF EDUCATION - PRIMARY LEARNER  4 YEARS OF COLLEGE   BARRIERS PRIMARY LEARNER NONE   PRIMARY LANGUAGE ENGLISH   LEARNER PREFERENCE PRIMARY DEMONSTRATION   ANSWERED BY Patient   RELATIONSHIP SELF       Depression Screening:  :     3 most recent PHQ Screens 12/1/2021   Little interest or pleasure in doing things Not at all   Feeling down, depressed, irritable, or hopeless Not at all   Total Score PHQ 2 0       Fall Risk Assessment:  :     No flowsheet data found. Abuse Screening:  :     Abuse Screening Questionnaire 12/1/2021 2/21/2018   Do you ever feel afraid of your partner? N N   Are you in a relationship with someone who physically or mentally threatens you? N N   Is it safe for you to go home?  Henery Flavors

## 2022-01-11 ENCOUNTER — OFFICE VISIT (OUTPATIENT)
Dept: INTERNAL MEDICINE CLINIC | Age: 42
End: 2022-01-11
Payer: COMMERCIAL

## 2022-01-11 VITALS
HEART RATE: 75 BPM | OXYGEN SATURATION: 98 % | RESPIRATION RATE: 14 BRPM | DIASTOLIC BLOOD PRESSURE: 80 MMHG | WEIGHT: 142.6 LBS | TEMPERATURE: 98.4 F | HEIGHT: 66 IN | BODY MASS INDEX: 22.92 KG/M2 | SYSTOLIC BLOOD PRESSURE: 120 MMHG

## 2022-01-11 DIAGNOSIS — I10 ESSENTIAL HYPERTENSION: Primary | ICD-10-CM

## 2022-01-11 DIAGNOSIS — F32.9 REACTIVE DEPRESSION: ICD-10-CM

## 2022-01-11 PROCEDURE — 99213 OFFICE O/P EST LOW 20 MIN: CPT | Performed by: INTERNAL MEDICINE

## 2022-01-11 NOTE — PROGRESS NOTES
Follow Up Visit    Heydi Castillo is a 37 y.o. female. she presents for Hypertension    Cardiovascular Review  The patient has hypertension. She reports taking medications (amlodipine) as instructed, no medication side effects noted, no chest pain on exertion, no dyspnea on exertion. Diet and Lifestyle: generally follows a low fat low cholesterol diet, generally follows a low sodium diet, exercises regularly. Blood pressures at home have been in normal range. Lab review: labs reviewed and discussed with patient. She has also tried to address the impact of stress on her blood pressure. She is feeling calmer but we discussed the need to follow up with the counselor to ensure things to not worsen. Patient Active Problem List   Diagnosis Code    Recurrent cold sores B00.1    Abnormal Pap smear of cervix R87.619    Hyperlipidemia E78.5    Active labor at term [id number]    Breech presentation O32. 1XX0     delivery delivered O82    Bilateral impacted cerumen H61.23         Prior to Admission medications    Medication Sig Start Date End Date Taking? Authorizing Provider   amLODIPine (NORVASC) 5 mg tablet Take 1 Tablet by mouth daily. 21  Yes Roscoe Cabrera MD   APRI 0.15-0.03 mg tab TAKE 1 TABLET BY MOUTH EVERY DAY 16  Yes Provider, Historical         Health Maintenance   Topic Date Due    Hepatitis C Screening  Never done    COVID-19 Vaccine (1) Never done    Cervical cancer screen  Never done    DTaP/Tdap/Td series (2 - Td or Tdap) 2024    Lipid Screen  2026    Flu Vaccine  Completed    Pneumococcal 0-64 years  Aged Out       Review of Systems   Constitutional: Negative. HENT: Negative. Respiratory: Negative for cough. Cardiovascular: Negative for chest pain and palpitations. Gastrointestinal: Negative. Musculoskeletal: Negative. All other systems reviewed and are negative.           Visit Vitals  /80 (BP 1 Location: Left arm, BP Patient Position: Sitting, BP Cuff Size: Adult)   Pulse 75   Temp 98.4 °F (36.9 °C) (Temporal)   Resp 14   Ht 5' 6\" (1.676 m)   Wt 142 lb 9.6 oz (64.7 kg)   LMP 01/05/2022 (Approximate)   SpO2 98%   BMI 23.02 kg/m²       Physical Exam  Constitutional:       Appearance: Normal appearance. She is well-developed. Cardiovascular:      Rate and Rhythm: Normal rate and regular rhythm. Pulmonary:      Effort: Pulmonary effort is normal.      Breath sounds: Normal breath sounds. Neurological:      Mental Status: She is alert. ASSESSMENT/PLAN    Diagnoses and all orders for this visit:    1. Essential hypertension - Pressures are improved with amlodipine. She feels better and has changed her diet and lifestyle. We will continue medications as ordered    2. Reactive depression - Advised follow up with psychology for now.   She will call for an appointment.   -     REFERRAL TO PSYCHOLOGY

## 2022-01-11 NOTE — PROGRESS NOTES
Chief Complaint   Patient presents with    Hypertension     Reviewed record in preparation for visit and have obtained necessary documentation. Identified pt with two pt identifiers(name and ). Health Maintenance Due   Topic    Hepatitis C Screening     COVID-19 Vaccine (1)    Cervical cancer screen          Chief Complaint   Patient presents with    Hypertension        Wt Readings from Last 3 Encounters:   22 142 lb 9.6 oz (64.7 kg)   21 143 lb (64.9 kg)   20 149 lb 3.2 oz (67.7 kg)     Temp Readings from Last 3 Encounters:   22 98.4 °F (36.9 °C) (Temporal)   21 97.1 °F (36.2 °C) (Temporal)   20 97.3 °F (36.3 °C) (Temporal)     BP Readings from Last 3 Encounters:   22 120/80   21 (!) 145/90   20 132/84     Pulse Readings from Last 3 Encounters:   22 75   21 81   20 89           Learning Assessment:  :     Learning Assessment 2018   PRIMARY LEARNER Patient   HIGHEST LEVEL OF EDUCATION - PRIMARY LEARNER  4 YEARS OF COLLEGE   BARRIERS PRIMARY LEARNER NONE   PRIMARY LANGUAGE ENGLISH   LEARNER PREFERENCE PRIMARY DEMONSTRATION   ANSWERED BY Patient   RELATIONSHIP SELF       Depression Screening:  :     3 most recent PHQ Screens 2022   Little interest or pleasure in doing things Not at all   Feeling down, depressed, irritable, or hopeless Not at all   Total Score PHQ 2 0       Fall Risk Assessment:  :     No flowsheet data found. Abuse Screening:  :     Abuse Screening Questionnaire 2022   Do you ever feel afraid of your partner? N N N   Are you in a relationship with someone who physically or mentally threatens you? N N N   Is it safe for you to go home?  Y Y Y       Coordination of Care Questionnaire:  :     1) Have you been to an emergency room, urgent care clinic since your last visit? no   Hospitalized since your last visit? no             2) Have you seen or consulted any other health care providers outside of [address] [place] [address] since your last visit? no  (Include any pap smears or colon screenings in this section.)    3) Do you have an Advance Directive on file? no    4) Are you interested in receiving information on Advance Directives? NO      Patient is accompanied by self I have received verbal consent from Lisbet Butts to discuss any/all medical information while they are present in the room. Chief Complaint   Patient presents with    Hypertension     Reviewed record in preparation for visit and have obtained necessary documentation. Identified pt with two pt identifiers(name and ). Health Maintenance Due   Topic    Hepatitis C Screening     COVID-19 Vaccine (1)    Cervical cancer screen          Chief Complaint   Patient presents with    Hypertension        Wt Readings from Last 3 Encounters:   22 142 lb 9.6 oz (64.7 kg)   21 143 lb (64.9 kg)   20 149 lb 3.2 oz (67.7 kg)     Temp Readings from Last 3 Encounters:   22 98.4 °F (36.9 °C) (Temporal)   21 97.1 °F (36.2 °C) (Temporal)   20 97.3 °F (36.3 °C) (Temporal)     BP Readings from Last 3 Encounters:   22 120/80   21 (!) 145/90   20 132/84     Pulse Readings from Last 3 Encounters:   22 75   21 81   20 89           Learning Assessment:  :     Learning Assessment 2018   PRIMARY LEARNER Patient   HIGHEST LEVEL OF EDUCATION - PRIMARY LEARNER  4 YEARS OF COLLEGE   BARRIERS PRIMARY LEARNER NONE   PRIMARY LANGUAGE ENGLISH   LEARNER PREFERENCE PRIMARY DEMONSTRATION   ANSWERED BY Patient   RELATIONSHIP SELF       Depression Screening:  :     3 most recent PHQ Screens 2022   Little interest or pleasure in doing things Not at all   Feeling down, depressed, irritable, or hopeless Not at all   Total Score PHQ 2 0       Fall Risk Assessment:  :     No flowsheet data found.     Abuse Screening:  :     Abuse Screening Questionnaire 2022 2/21/2018   Do you ever feel afraid of your partner? N N N   Are you in a relationship with someone who physically or mentally threatens you? N N N   Is it safe for you to go home? Y Y Y       Coordination of Care Questionnaire:  :     1) Have you been to an emergency room, urgent care clinic since your last visit? no   Hospitalized since your last visit? no             2) Have you seen or consulted any other health care providers outside of [address] [place] [address] since your last visit? no  (Include any pap smears or colon screenings in this section.)    3) Do you have an Advance Directive on file? no    4) Are you interested in receiving information on Advance Directives? NO      Patient is accompanied by self I have received verbal consent from Shree Elkins to discuss any/all medical information while they are present in the room. Reviewed record  In preparation for visit and have obtained necessary documentation.

## 2022-03-17 ENCOUNTER — OFFICE VISIT (OUTPATIENT)
Dept: INTERNAL MEDICINE CLINIC | Age: 42
End: 2022-03-17
Payer: COMMERCIAL

## 2022-03-17 VITALS
DIASTOLIC BLOOD PRESSURE: 82 MMHG | WEIGHT: 145 LBS | SYSTOLIC BLOOD PRESSURE: 130 MMHG | OXYGEN SATURATION: 98 % | HEART RATE: 76 BPM | TEMPERATURE: 98.6 F | RESPIRATION RATE: 14 BRPM | HEIGHT: 66 IN | BODY MASS INDEX: 23.3 KG/M2

## 2022-03-17 DIAGNOSIS — I10 ESSENTIAL HYPERTENSION: Primary | ICD-10-CM

## 2022-03-17 PROCEDURE — 99213 OFFICE O/P EST LOW 20 MIN: CPT | Performed by: INTERNAL MEDICINE

## 2022-03-17 NOTE — PROGRESS NOTES
Chief Complaint   Patient presents with    Hypertension     Reviewed record in preparation for visit and have obtained necessary documentation. Identified pt with two pt identifiers(name and ). Health Maintenance Due   Topic    Hepatitis C Screening     Cervical cancer screen          Chief Complaint   Patient presents with    Hypertension        Wt Readings from Last 3 Encounters:   22 145 lb (65.8 kg)   22 142 lb 9.6 oz (64.7 kg)   21 143 lb (64.9 kg)     Temp Readings from Last 3 Encounters:   22 98.6 °F (37 °C) (Temporal)   22 98.4 °F (36.9 °C) (Temporal)   21 97.1 °F (36.2 °C) (Temporal)     BP Readings from Last 3 Encounters:   22 130/82   22 120/80   21 (!) 145/90     Pulse Readings from Last 3 Encounters:   22 76   22 75   21 81           Learning Assessment:  :     Learning Assessment 2018   PRIMARY LEARNER Patient   HIGHEST LEVEL OF EDUCATION - PRIMARY LEARNER  4 YEARS OF COLLEGE   BARRIERS PRIMARY LEARNER NONE   PRIMARY LANGUAGE ENGLISH   LEARNER PREFERENCE PRIMARY DEMONSTRATION   ANSWERED BY Patient   RELATIONSHIP SELF       Depression Screening:  :     3 most recent PHQ Screens 3/17/2022   Little interest or pleasure in doing things Not at all   Feeling down, depressed, irritable, or hopeless Not at all   Total Score PHQ 2 0       Fall Risk Assessment:  :     No flowsheet data found. Abuse Screening:  :     Abuse Screening Questionnaire 3/17/2022 2022 2021 2018   Do you ever feel afraid of your partner? N N N N   Are you in a relationship with someone who physically or mentally threatens you? N N N N   Is it safe for you to go home?  Y Y Y Y       Coordination of Care Questionnaire:  :     1) Have you been to an emergency room, urgent care clinic since your last visit? no   Hospitalized since your last visit? no             2) Have you seen or consulted any other health care providers outside of [facility] since your last visit? no  (Include any pap smears or colon screenings in this section.)    3) Do you have an Advance Directive on file? no    4) Are you interested in receiving information on Advance Directives? NO      Patient is accompanied by self I have received verbal consent from Keisha Malloy to discuss any/all medical information while they are present in the room. Reviewed record  In preparation for visit and have obtained necessary documentation.

## 2022-03-19 PROBLEM — H61.23 BILATERAL IMPACTED CERUMEN: Status: ACTIVE | Noted: 2018-02-21

## 2022-04-05 NOTE — PROGRESS NOTES
Follow Up Visit    Rafa Vasquez is a 37 y.o. female. she presents for Hypertension    Cardiovascular Review  The patient has hypertension. She reports taking medications as instructed, no medication side effects noted. Diet and Lifestyle: generally follows a low fat low cholesterol diet, generally follows a low sodium diet. Lab review: labs reviewed and discussed with patient. She is on amlodipine now and tolerating this. Patient Active Problem List   Diagnosis Code    Recurrent cold sores B00.1    Abnormal Pap smear of cervix R87.619    Hyperlipidemia E78.5    Active labor at term [id number]    Breech presentation O32. 1XX0     delivery delivered O82    Bilateral impacted cerumen H61.23         Prior to Admission medications    Medication Sig Start Date End Date Taking? Authorizing Provider   amLODIPine (NORVASC) 5 mg tablet TAKE 1 TABLET BY MOUTH EVERY DAY 2/15/22  Yes Devi Cavazos MD   APRI 0.15-0.03 mg tab TAKE 1 TABLET BY MOUTH EVERY DAY 16  Yes Provider, Historical         Health Maintenance   Topic Date Due    Hepatitis C Screening  Never done    Cervical cancer screen  Never done    Depression Monitoring  2023    DTaP/Tdap/Td series (2 - Td or Tdap) 2024    Lipid Screen  2026    Flu Vaccine  Completed    COVID-19 Vaccine  Completed    Pneumococcal 0-64 years  Aged Out       Review of Systems   Constitutional: Negative. Respiratory: Negative. Cardiovascular: Negative. Gastrointestinal: Negative. Visit Vitals  /82 (BP 1 Location: Left arm, BP Patient Position: Sitting, BP Cuff Size: Adult)   Pulse 76   Temp 98.6 °F (37 °C) (Temporal)   Resp 14   Ht 5' 6\" (1.676 m)   Wt 145 lb (65.8 kg)   SpO2 98%   BMI 23.40 kg/m²       Physical Exam  Constitutional:       Appearance: She is well-developed. Cardiovascular:      Rate and Rhythm: Normal rate and regular rhythm.    Pulmonary:      Effort: Pulmonary effort is normal. Breath sounds: Normal breath sounds. ASSESSMENT/PLAN    Diagnoses and all orders for this visit:    1. Essential hypertension -Continue medications as ordered. She is tolerating medications well. Pressures controlled.

## 2022-06-16 ENCOUNTER — VIRTUAL VISIT (OUTPATIENT)
Dept: INTERNAL MEDICINE CLINIC | Age: 42
End: 2022-06-16
Payer: COMMERCIAL

## 2022-06-16 DIAGNOSIS — I10 ESSENTIAL HYPERTENSION: Primary | ICD-10-CM

## 2022-06-16 PROCEDURE — 99213 OFFICE O/P EST LOW 20 MIN: CPT | Performed by: INTERNAL MEDICINE

## 2022-06-16 NOTE — LETTER
6/16/2022 3:24 PM    Ms. [facility] [id number]      To whom it may concern:      I have seen the above named patient, Jeet Domingo in virtual consultation today, June 16th, 2022. She reports having tested positive for COVID on June 11th, 2022. She has completed her 5 day quarantine and appears to have had a clinical recovery. No further quarantine or treatment is necessary.             Sincerely,        Kailee Mederos MD

## 2022-07-05 NOTE — PROGRESS NOTES
Kareem Aguirre is a 37 y.o. female who was seen by synchronous (real-time) audio-video technology on 2022. She confirmed that, for purposes of billing, this is a virtual visit with her provider for which we will submit a claim for reimbursement to her insurance company. She is aware that she will be responsible for any copays, coinsurance amounts or other amounts not covered by her insurance company. Do you accept - YES    This visit was completed was completed virtually using [employer]        Subjective:   Kareem Aguirre was seen for follow up    Cardiovascular Review  The patient has hypertension. She reports taking medications as instructed, no medication side effects noted, no chest pain on exertion, no dyspnea on exertion. Diet and Lifestyle: generally follows a low fat low cholesterol diet, generally follows a low sodium diet, exercises regularly, nonsmoker. Lab review: labs reviewed and discussed with patient. Prior to Admission medications    Medication Sig Start Date End Date Taking?  Authorizing Provider   amLODIPine (NORVASC) 5 mg tablet TAKE 1 TABLET BY MOUTH EVERY DAY 2/15/22  Yes Susy Rojas MD   APRI 0.15-0.03 mg tab TAKE 1 TABLET BY MOUTH EVERY DAY 16  Yes Provider, Historical       No Known Allergies    Patient Active Problem List    Diagnosis Date Noted    Bilateral impacted cerumen 2018    Active labor at term 2014    Breech presentation 2014     delivery delivered 2014    Hyperlipidemia 2012    Recurrent cold sores     Abnormal Pap smear of cervix      Current Outpatient Medications   Medication Sig Dispense Refill    amLODIPine (NORVASC) 5 mg tablet TAKE 1 TABLET BY MOUTH EVERY DAY 90 Tablet 1    APRI 0.15-0.03 mg tab TAKE 1 TABLET BY MOUTH EVERY DAY  3     No Known Allergies  Past Medical History:   Diagnosis Date    Abnormal Pap smear of cervix     Colpo, client states normal since     Depression     GERD (gastroesophageal reflux disease)     HPV in female     Hyperlipidemia     high HDL, borderling LDL on previous testing    Recurrent cold sores      Past Surgical History:   Procedure Laterality Date    HX GYN  2012    colposcopy    HX GYN  13    D&C - missed     HX HEENT      WISDOM TEETH    HX OTHER SURGICAL      wisdom teeth     HX OTHER SURGICAL      D&C     TX BREAST SURGERY PROCEDURE UNLISTED      left breast  - lumpectomy (negative)    TX BREAST SURGERY PROCEDURE UNLISTED  2011    right breast - staple placed     Family History   Problem Relation Age of Onset    Hypertension Father     Coronary Art Dis Father 54        stents x2    Cancer Brother 22        tongue    Cancer Maternal Grandmother         late onset breast CA    Hypertension Paternal Grandmother     Malignant Hyperthermia Neg Hx     Pseudocholinesterase Deficiency Neg Hx     Delayed Awakening Neg Hx     Post-op Nausea/Vomiting Neg Hx      Social History     Tobacco Use    Smoking status: Never Smoker    Smokeless tobacco: Never Used   Substance Use Topics    Alcohol use: Yes     Alcohol/week: 5.0 standard drinks     Types: 5 Glasses of wine per week          ROS - per HPI      Objective:     General: alert, cooperative, no distress   Mental  status: pe mental status_general use: normal mood, behavior, speech, dress, motor activity, and thought processes, able to follow commands   Eyes: EOM intact, normal sclera   Mouth: mucous membranes moist   Neck: no visualized mass   Resp: PULM - obs findings: normal effort and no respiratory distress   Neuro: neuro - obs: no gross deficits   Musculoskeletal: normal ROM of neck and normal gait w/o ataxia   Skin: skin exam: no discoloration or lesions of concern on visible areas   Psychiatric: normal affect, no hallucinations           Assessment & Plan:   1. Essential hypertension - Discussed with the patient to continue the current medication course. They will continue healthy habits and follow up as directed for further health maintenance. The patient agrees with and understands the plan of care. All questions have been answered. Due to this being a TeleHealth evaluation, many elements of the physical examination are unable to be assessed. Pursuant to the emergency declaration under the [facility] [place] [address] [facility] authority and the [employer] and [employer] Act, this Virtual  Visit was conducted, with patient's consent, to reduce the patient's risk of exposure to COVID-19 and provide continuity of care for an established patient. Services were provided through a video synchronous discussion virtually to substitute for in-person clinic visit. We discussed the expected course, resolution and complications of the diagnosis(es) in detail. Medication risks, benefits, costs, interactions, and alternatives were discussed as indicated. I advised her to contact the office if her condition worsens, changes or fails to improve as anticipated. She expressed understanding with the diagnosis(es) and plan.      Checo Knowles MD

## 2022-08-15 DIAGNOSIS — R03.0 ELEVATED BLOOD PRESSURE READING: ICD-10-CM

## 2022-08-17 RX ORDER — AMLODIPINE BESYLATE 5 MG/1
TABLET ORAL
Qty: 90 TABLET | Refills: 0 | Status: SHIPPED | OUTPATIENT
Start: 2022-08-17

## 2022-11-19 DIAGNOSIS — R03.0 ELEVATED BLOOD PRESSURE READING: ICD-10-CM

## 2022-11-21 RX ORDER — AMLODIPINE BESYLATE 5 MG/1
TABLET ORAL
Qty: 90 TABLET | Refills: 0 | Status: SHIPPED | OUTPATIENT
Start: 2022-11-21

## 2022-12-05 ENCOUNTER — OFFICE VISIT (OUTPATIENT)
Dept: INTERNAL MEDICINE CLINIC | Age: 42
End: 2022-12-05
Payer: COMMERCIAL

## 2022-12-05 VITALS
RESPIRATION RATE: 14 BRPM | HEART RATE: 74 BPM | WEIGHT: 148.2 LBS | DIASTOLIC BLOOD PRESSURE: 80 MMHG | TEMPERATURE: 97.1 F | HEIGHT: 66 IN | SYSTOLIC BLOOD PRESSURE: 140 MMHG | BODY MASS INDEX: 23.82 KG/M2 | OXYGEN SATURATION: 99 %

## 2022-12-05 DIAGNOSIS — R03.0 ELEVATED BLOOD PRESSURE READING: ICD-10-CM

## 2022-12-05 DIAGNOSIS — I10 ESSENTIAL HYPERTENSION: ICD-10-CM

## 2022-12-05 DIAGNOSIS — B00.1 RECURRENT COLD SORES: ICD-10-CM

## 2022-12-05 DIAGNOSIS — Z00.00 WELL ADULT EXAM: Primary | ICD-10-CM

## 2022-12-05 PROCEDURE — 99396 PREV VISIT EST AGE 40-64: CPT | Performed by: INTERNAL MEDICINE

## 2022-12-05 RX ORDER — VALACYCLOVIR HYDROCHLORIDE 1 G/1
1000 TABLET, FILM COATED ORAL
Qty: 30 TABLET | Refills: 1 | Status: SHIPPED | OUTPATIENT
Start: 2022-12-05

## 2022-12-05 NOTE — PROGRESS NOTES
Chief Complaint   Patient presents with    Complete Physical     Reviewed record in preparation for visit and have obtained necessary documentation. Identified pt with two pt identifiers(name and ). Health Maintenance Due   Topic    Hepatitis C Screening     Cervical cancer screen     COVID-19 Vaccine (4 - Booster for Pfizer series)    Flu Vaccine (1)         Chief Complaint   Patient presents with    Complete Physical        Wt Readings from Last 3 Encounters:   22 148 lb 3.2 oz (67.2 kg)   22 145 lb (65.8 kg)   22 142 lb 9.6 oz (64.7 kg)     Temp Readings from Last 3 Encounters:   22 97.1 °F (36.2 °C) (Temporal)   22 98.6 °F (37 °C) (Temporal)   22 98.4 °F (36.9 °C) (Temporal)     BP Readings from Last 3 Encounters:   22 (!) 140/90   22 130/82   22 120/80     Pulse Readings from Last 3 Encounters:   22 74   22 76   22 75           Learning Assessment:  :     Learning Assessment 2018   PRIMARY LEARNER Patient   HIGHEST LEVEL OF EDUCATION - PRIMARY LEARNER  4 YEARS OF COLLEGE   BARRIERS PRIMARY LEARNER NONE   PRIMARY LANGUAGE ENGLISH   LEARNER PREFERENCE PRIMARY DEMONSTRATION   ANSWERED BY Patient   RELATIONSHIP SELF       Depression Screening:  :     3 most recent PHQ Screens 2022   Little interest or pleasure in doing things Not at all   Feeling down, depressed, irritable, or hopeless Not at all   Total Score PHQ 2 0       Fall Risk Assessment:  :     No flowsheet data found. Abuse Screening:  :     Abuse Screening Questionnaire 2022 3/17/2022 2022 2021 2018   Do you ever feel afraid of your partner? N N N N N   Are you in a relationship with someone who physically or mentally threatens you? N N N N N   Is it safe for you to go home?  Y Y Y Y Y       Coordination of Care Questionnaire:  :     1) Have you been to an emergency room, urgent care clinic since your last visit? no   Hospitalized since your last visit? no             2) Have you seen or consulted any other health care providers outside of [address] [place] [address] since your last visit? no  (Include any pap smears or colon screenings in this section.)    3) Do you have an Advance Directive on file? no    4) Are you interested in receiving information on Advance Directives? NO      Patient is accompanied by self I have received verbal consent from Oksana Chakraborty to discuss any/all medical information while they are present in the room. Reviewed record  In preparation for visit and have obtained necessary documentation.

## 2022-12-05 NOTE — PROGRESS NOTES
Comprehensive Physical Examination    Angelina Barney is a 40 y.o. female. she presents for a comprehensive physical examination. Cardiovascular Review  The patient has hypertension. She reports taking medications as instructed, no medication side effects noted. Diet and Lifestyle: generally follows a low fat low cholesterol diet, generally follows a low sodium diet. Lab review: labs reviewed and discussed with patient. Her pressure is slightly elevated today. She had a saltier foods over the weekend. She has no acute complaints. She has been working out regularly.         Patient Active Problem List    Diagnosis Date Noted    Bilateral impacted cerumen 2018    Active labor at term 2014    Breech presentation 2014     delivery delivered 2014    Hyperlipidemia 2012    Recurrent cold sores     Abnormal Pap smear of cervix      Current Outpatient Medications   Medication Sig Dispense Refill    amLODIPine (NORVASC) 5 mg tablet TAKE 1 TABLET BY MOUTH EVERY DAY 90 Tablet 0    APRI 0.15-0.03 mg tab TAKE 1 TABLET BY MOUTH EVERY DAY  3     No Known Allergies  Past Medical History:   Diagnosis Date    Abnormal Pap smear of cervix     Colpo, client states normal since     Depression     GERD (gastroesophageal reflux disease)     HPV in female     Hyperlipidemia     high HDL, borderling LDL on previous testing    Recurrent cold sores      Past Surgical History:   Procedure Laterality Date    HX GYN  2012    colposcopy    HX GYN  14    D&C - missed     HX HEENT      WISDOM TEETH    HX OTHER SURGICAL      wisdom teeth     HX OTHER SURGICAL      D&C     [facility] BREAST SURGERY PROCEDURE UNLISTED      left breast  - lumpectomy (negative)    [place] BREAST SURGERY PROCEDURE UNLISTED  2011    right breast - staple placed     Family History   Problem Relation Age of Onset    Hypertension Father     Coronary Art Dis Father 54        stents x2    Cancer Brother 25        tongue    Cancer Maternal Grandmother         late onset breast CA    Hypertension Paternal Grandmother     Malignant Hyperthermia Neg Hx     Pseudocholinesterase Deficiency Neg Hx     Delayed Awakening Neg Hx     Post-op Nausea/Vomiting Neg Hx      Social History     Tobacco Use    Smoking status: Never    Smokeless tobacco: Never   Substance Use Topics    Alcohol use: Yes     Alcohol/week: 5.0 standard drinks     Types: 5 Glasses of wine per week        Health Maintenance   Topic Date Due    Hepatitis C Screening  Never done    Cervical cancer screen  Never done    COVID-19 Vaccine (4 - Booster for Pfizer series) 12/19/2021    Flu Vaccine (1) 08/01/2022    Depression Monitoring  06/16/2023    DTaP/Tdap/Td series (2 - Td or Tdap) 01/01/2024    Lipid Screen  12/06/2026    Pneumococcal 0-64 years  Aged Out         Review of Systems   Constitutional: Negative. HENT: Negative. Respiratory:  Negative for cough. Cardiovascular:  Negative for chest pain and palpitations. Gastrointestinal: Negative. Musculoskeletal: Negative. All other systems reviewed and are negative. Visit Vitals  BP (!) 140/90   Pulse 74   Temp 97.1 °F (36.2 °C) (Temporal)   Resp 14   Ht 5' 6\" (1.676 m)   Wt 148 lb 3.2 oz (67.2 kg)   LMP 11/08/2022 (Approximate)   SpO2 99%   BMI 23.92 kg/m²       Physical Exam  Constitutional:       General: She is not in acute distress. Cardiovascular:      Rate and Rhythm: Normal rate and regular rhythm. Heart sounds: No murmur heard. Pulmonary:      Effort: Pulmonary effort is normal.      Breath sounds: Normal breath sounds. No wheezing. Abdominal:      General: Bowel sounds are normal.      Palpations: Abdomen is soft. Musculoskeletal:         General: Normal range of motion. Cervical back: Neck supple. Neurological:      Mental Status: She is alert and oriented to person, place, and time.          ASSESSMENT/PLAN  Diagnoses and all orders for this visit: 1. Well adult exam  -     LIPID PANEL; Future  -     METABOLIC PANEL, COMPREHENSIVE; Future  -     CBC WITH AUTOMATED DIFF; Future  -     TSH 3RD GENERATION; Future    2. Elevated blood pressure reading    3. Essential hypertension    4. Recurrent cold sores  -     valACYclovir (VALTREX) 1 gram tablet; Take 1 Tablet by mouth two (2) times daily as needed for PRN Reason (Other) (cold sore). Follow-up and Dispositions    Return in about 1 year (around 12/5/2023).

## 2023-02-16 DIAGNOSIS — R03.0 ELEVATED BLOOD PRESSURE READING: ICD-10-CM

## 2023-02-17 RX ORDER — AMLODIPINE BESYLATE 5 MG/1
5 TABLET ORAL DAILY
Qty: 90 TABLET | Refills: 0 | Status: SHIPPED | OUTPATIENT
Start: 2023-02-17

## 2023-05-20 RX ORDER — VALACYCLOVIR HYDROCHLORIDE 1 G/1
1000 TABLET, FILM COATED ORAL 2 TIMES DAILY PRN
COMMUNITY
Start: 2022-12-05

## 2023-05-20 RX ORDER — DESOGESTREL AND ETHINYL ESTRADIOL 0.15-0.03
1 KIT ORAL DAILY
COMMUNITY
Start: 2016-09-23

## 2023-05-20 RX ORDER — AMLODIPINE BESYLATE 5 MG/1
5 TABLET ORAL DAILY
COMMUNITY
Start: 2023-02-17

## 2023-10-12 DIAGNOSIS — R03.0 ELEVATED BLOOD-PRESSURE READING, WITHOUT DIAGNOSIS OF HYPERTENSION: ICD-10-CM

## 2023-10-13 RX ORDER — AMLODIPINE BESYLATE 5 MG/1
5 TABLET ORAL DAILY
Qty: 90 TABLET | Refills: 1 | Status: SHIPPED | OUTPATIENT
Start: 2023-10-13

## 2024-01-22 ENCOUNTER — OFFICE VISIT (OUTPATIENT)
Age: 44
End: 2024-01-22
Payer: COMMERCIAL

## 2024-01-22 VITALS
SYSTOLIC BLOOD PRESSURE: 163 MMHG | WEIGHT: 146 LBS | TEMPERATURE: 98.3 F | RESPIRATION RATE: 16 BRPM | DIASTOLIC BLOOD PRESSURE: 93 MMHG | OXYGEN SATURATION: 98 % | HEART RATE: 88 BPM | BODY MASS INDEX: 23.46 KG/M2 | HEIGHT: 66 IN

## 2024-01-22 DIAGNOSIS — I10 PRIMARY HYPERTENSION: Primary | ICD-10-CM

## 2024-01-22 DIAGNOSIS — Z00.00 ADULT GENERAL MEDICAL EXAM: ICD-10-CM

## 2024-01-22 DIAGNOSIS — B00.1 RECURRENT COLD SORES: ICD-10-CM

## 2024-01-22 DIAGNOSIS — E78.5 HYPERLIPIDEMIA, UNSPECIFIED HYPERLIPIDEMIA TYPE: ICD-10-CM

## 2024-01-22 PROCEDURE — 3080F DIAST BP >= 90 MM HG: CPT | Performed by: STUDENT IN AN ORGANIZED HEALTH CARE EDUCATION/TRAINING PROGRAM

## 2024-01-22 PROCEDURE — 3077F SYST BP >= 140 MM HG: CPT | Performed by: STUDENT IN AN ORGANIZED HEALTH CARE EDUCATION/TRAINING PROGRAM

## 2024-01-22 PROCEDURE — 99213 OFFICE O/P EST LOW 20 MIN: CPT | Performed by: STUDENT IN AN ORGANIZED HEALTH CARE EDUCATION/TRAINING PROGRAM

## 2024-01-22 RX ORDER — VALACYCLOVIR HYDROCHLORIDE 1 G/1
1000 TABLET, FILM COATED ORAL 2 TIMES DAILY PRN
Qty: 30 TABLET | Refills: 3 | Status: SHIPPED | OUTPATIENT
Start: 2024-01-22

## 2024-01-22 SDOH — ECONOMIC STABILITY: HOUSING INSECURITY
IN THE LAST 12 MONTHS, WAS THERE A TIME WHEN YOU DID NOT HAVE A STEADY PLACE TO SLEEP OR SLEPT IN A SHELTER (INCLUDING NOW)?: NO

## 2024-01-22 SDOH — ECONOMIC STABILITY: FOOD INSECURITY: WITHIN THE PAST 12 MONTHS, THE FOOD YOU BOUGHT JUST DIDN'T LAST AND YOU DIDN'T HAVE MONEY TO GET MORE.: NEVER TRUE

## 2024-01-22 SDOH — ECONOMIC STABILITY: INCOME INSECURITY: HOW HARD IS IT FOR YOU TO PAY FOR THE VERY BASICS LIKE FOOD, HOUSING, MEDICAL CARE, AND HEATING?: NOT HARD AT ALL

## 2024-01-22 SDOH — ECONOMIC STABILITY: FOOD INSECURITY: WITHIN THE PAST 12 MONTHS, YOU WORRIED THAT YOUR FOOD WOULD RUN OUT BEFORE YOU GOT MONEY TO BUY MORE.: NEVER TRUE

## 2024-01-22 ASSESSMENT — ENCOUNTER SYMPTOMS
COUGH: 0
BLOOD IN STOOL: 0
ABDOMINAL PAIN: 0
VOMITING: 0
DIARRHEA: 0
NAUSEA: 0
SHORTNESS OF BREATH: 0

## 2024-01-22 ASSESSMENT — PATIENT HEALTH QUESTIONNAIRE - PHQ9
2. FEELING DOWN, DEPRESSED OR HOPELESS: 0
SUM OF ALL RESPONSES TO PHQ QUESTIONS 1-9: 0
SUM OF ALL RESPONSES TO PHQ9 QUESTIONS 1 & 2: 0
SUM OF ALL RESPONSES TO PHQ QUESTIONS 1-9: 0
1. LITTLE INTEREST OR PLEASURE IN DOING THINGS: 0

## 2024-01-22 NOTE — PROGRESS NOTES
Evita Patel is a 43 y.o. year old female who is a new patient to me today (01/22/24).  She was previously followed by Dr. Vazquez.      Assessment & Plan:   1. Primary hypertension  Assessment & Plan:  Will adjust how she is checking her BP - will start checking in the evening about an hour after taking her amlodipine and she will send me her readings in about two weeks. If BP persistently elevated will consider increasing her amlodipine.   2. Adult general medical exam  -     Comprehensive Metabolic Panel; Future  -     CBC; Future  3. Hyperlipidemia, unspecified hyperlipidemia type  -     Comprehensive Metabolic Panel; Future  -     Lipid Panel; Future  4. Recurrent cold sores  -     valACYclovir (VALTREX) 1 g tablet; Take 1 tablet by mouth 2 times daily as needed (for cold sore), Disp-30 tablet, R-3Normal        Return in about 1 year (around 1/22/2025) for Annual Physical.      Subjective:   Evita was seen today for New Patient and Anxiety    She has a hx of HTN and notes her BP has been higher recently in the 130's systolic during the week. She reports increased stress at work but has been working on being more mindful and taking breaks while she is at work. On the weekends her BP is normal, less than 120 systolic/70's. She denies any associated symptoms of chest pain, headache, dizziness, etc.     Otherwise she denies any acute complaints and is here to get re-established and update annual labs.     Care team:  Patient Care Team:  Natanael Haywood DO as PCP - General (Internal Medicine)    Health Maintenance:   Reviewed, up to date. Has pap and mammo through GYN    Review of Systems    Review of Systems   Constitutional:  Negative for chills and fever.   Respiratory:  Negative for cough and shortness of breath.    Cardiovascular:  Negative for chest pain and palpitations.   Gastrointestinal:  Negative for abdominal pain, blood in stool, constipation, diarrhea, nausea and vomiting.   Musculoskeletal:  Negative

## 2024-01-22 NOTE — PROGRESS NOTES
Verified name and birth date for privacy precautions.   Chart reviewed in preparation for today's visit.     Chief Complaint   Patient presents with    New Patient    Anxiety          Health Maintenance Due   Topic    Hepatitis B vaccine (1 of 3 - 3-dose series)    Varicella vaccine (1 of 2 - 2-dose childhood series)    Hepatitis C screen     DTaP/Tdap/Td vaccine (1 - Tdap)    Cervical cancer screen     Depression Screen     Flu vaccine (1)    COVID-19 Vaccine (5 - 2023-24 season)         Wt Readings from Last 3 Encounters:   01/22/24 66.2 kg (146 lb)   12/05/22 67.2 kg (148 lb 3.2 oz)   03/17/22 65.8 kg (145 lb)     Temp Readings from Last 3 Encounters:   01/22/24 98.3 °F (36.8 °C) (Oral)     BP Readings from Last 3 Encounters:   01/22/24 (!) 163/93   12/05/22 (!) 140/80   03/17/22 130/82     Pulse Readings from Last 3 Encounters:   01/22/24 88   12/05/22 74   03/17/22 76       Social Determinants of Health     Tobacco Use: Low Risk  (1/22/2024)    Patient History     Smoking Tobacco Use: Never     Smokeless Tobacco Use: Never     Passive Exposure: Not on file   Alcohol Use: Not on file   Financial Resource Strain: Low Risk  (1/22/2024)    Overall Financial Resource Strain (CARDIA)     Difficulty of Paying Living Expenses: Not hard at all   Food Insecurity: No Food Insecurity (1/22/2024)    Hunger Vital Sign     Worried About Running Out of Food in the Last Year: Never true     Ran Out of Food in the Last Year: Never true   Transportation Needs: Unknown (1/22/2024)    PRAPARE - Transportation     Lack of Transportation (Medical): Not on file     Lack of Transportation (Non-Medical): No   Physical Activity: Not on file   Stress: Not on file   Social Connections: Not on file   Intimate Partner Violence: Not on file   Depression: Not at risk (1/22/2024)    PHQ-2     PHQ-2 Score: 0   Housing Stability: Unknown (1/22/2024)    Housing Stability Vital Sign     Unable to Pay for Housing in the Last Year: Not on file

## 2024-01-22 NOTE — ASSESSMENT & PLAN NOTE
Will adjust how she is checking her BP - will start checking in the evening about an hour after taking her amlodipine and she will send me her readings in about two weeks. If BP persistently elevated will consider increasing her amlodipine.

## 2024-01-31 DIAGNOSIS — Z00.00 ADULT GENERAL MEDICAL EXAM: ICD-10-CM

## 2024-01-31 DIAGNOSIS — E78.5 HYPERLIPIDEMIA, UNSPECIFIED HYPERLIPIDEMIA TYPE: ICD-10-CM

## 2024-01-31 LAB
ALBUMIN SERPL-MCNC: 3.5 G/DL (ref 3.5–5)
ALBUMIN/GLOB SERPL: 0.9 (ref 1.1–2.2)
ALP SERPL-CCNC: 41 U/L (ref 45–117)
ALT SERPL-CCNC: 29 U/L (ref 12–78)
ANION GAP SERPL CALC-SCNC: 8 MMOL/L (ref 5–15)
AST SERPL-CCNC: 11 U/L (ref 15–37)
BILIRUB SERPL-MCNC: 0.6 MG/DL (ref 0.2–1)
BUN SERPL-MCNC: 15 MG/DL (ref 6–20)
BUN/CREAT SERPL: 19 (ref 12–20)
CALCIUM SERPL-MCNC: 9.3 MG/DL (ref 8.5–10.1)
CHLORIDE SERPL-SCNC: 106 MMOL/L (ref 97–108)
CHOLEST SERPL-MCNC: 279 MG/DL
CO2 SERPL-SCNC: 28 MMOL/L (ref 21–32)
CREAT SERPL-MCNC: 0.81 MG/DL (ref 0.55–1.02)
ERYTHROCYTE [DISTWIDTH] IN BLOOD BY AUTOMATED COUNT: 12.5 % (ref 11.5–14.5)
GLOBULIN SER CALC-MCNC: 3.8 G/DL (ref 2–4)
GLUCOSE SERPL-MCNC: 98 MG/DL (ref 65–100)
HCT VFR BLD AUTO: 42.2 % (ref 35–47)
HDLC SERPL-MCNC: 78 MG/DL
HDLC SERPL: 3.6 (ref 0–5)
HGB BLD-MCNC: 13.3 G/DL (ref 11.5–16)
LDLC SERPL CALC-MCNC: 169 MG/DL (ref 0–100)
MCH RBC QN AUTO: 29.3 PG (ref 26–34)
MCHC RBC AUTO-ENTMCNC: 31.5 G/DL (ref 30–36.5)
MCV RBC AUTO: 93 FL (ref 80–99)
NRBC # BLD: 0 K/UL (ref 0–0.01)
NRBC BLD-RTO: 0 PER 100 WBC
PLATELET # BLD AUTO: 460 K/UL (ref 150–400)
PMV BLD AUTO: 11.6 FL (ref 8.9–12.9)
POTASSIUM SERPL-SCNC: 4.2 MMOL/L (ref 3.5–5.1)
PROT SERPL-MCNC: 7.3 G/DL (ref 6.4–8.2)
RBC # BLD AUTO: 4.54 M/UL (ref 3.8–5.2)
SODIUM SERPL-SCNC: 142 MMOL/L (ref 136–145)
TRIGL SERPL-MCNC: 160 MG/DL
VLDLC SERPL CALC-MCNC: 32 MG/DL
WBC # BLD AUTO: 7.4 K/UL (ref 3.6–11)

## 2024-04-22 DIAGNOSIS — R03.0 ELEVATED BLOOD-PRESSURE READING, WITHOUT DIAGNOSIS OF HYPERTENSION: ICD-10-CM

## 2024-04-22 RX ORDER — AMLODIPINE BESYLATE 5 MG/1
5 TABLET ORAL DAILY
Qty: 90 TABLET | Refills: 1 | Status: SHIPPED | OUTPATIENT
Start: 2024-04-22

## 2024-10-25 DIAGNOSIS — R03.0 ELEVATED BLOOD-PRESSURE READING, WITHOUT DIAGNOSIS OF HYPERTENSION: ICD-10-CM

## 2024-10-28 DIAGNOSIS — R03.0 ELEVATED BLOOD-PRESSURE READING, WITHOUT DIAGNOSIS OF HYPERTENSION: ICD-10-CM

## 2024-10-28 RX ORDER — AMLODIPINE BESYLATE 5 MG/1
5 TABLET ORAL DAILY
Qty: 90 TABLET | Refills: 0 | Status: SHIPPED | OUTPATIENT
Start: 2024-10-28 | End: 2024-10-29

## 2024-10-28 NOTE — TELEPHONE ENCOUNTER
Pt last seen on 1/22/24. Due to return in one year.    Has no appt scheduled at this time. Will forward to front office pool to call pt.    Rx last filled on 4/22/24 #90/1/RF.    Will forward to MD for refill.

## 2024-10-29 RX ORDER — AMLODIPINE BESYLATE 5 MG/1
5 TABLET ORAL DAILY
Qty: 90 TABLET | Refills: 0 | Status: SHIPPED | OUTPATIENT
Start: 2024-10-29

## 2024-10-29 NOTE — TELEPHONE ENCOUNTER
Rx sent to pharmacy as previously filled and verified by Verbal Order Read Back with provider. LV 01/22/2024

## 2024-11-03 DIAGNOSIS — R03.0 ELEVATED BLOOD-PRESSURE READING, WITHOUT DIAGNOSIS OF HYPERTENSION: ICD-10-CM

## 2024-11-04 RX ORDER — AMLODIPINE BESYLATE 5 MG/1
5 TABLET ORAL DAILY
Qty: 90 TABLET | Refills: 1 | Status: SHIPPED | OUTPATIENT
Start: 2024-11-04

## 2024-11-04 NOTE — TELEPHONE ENCOUNTER
Rx sent to pharmacy as previously filled and verified by Verbal Order Read Back with provider.    NV 01/24/2025

## 2025-01-18 DIAGNOSIS — R03.0 ELEVATED BLOOD-PRESSURE READING, WITHOUT DIAGNOSIS OF HYPERTENSION: ICD-10-CM

## 2025-01-20 RX ORDER — AMLODIPINE BESYLATE 5 MG/1
5 TABLET ORAL DAILY
Qty: 90 TABLET | Refills: 3 | OUTPATIENT
Start: 2025-01-20

## 2025-01-20 NOTE — TELEPHONE ENCOUNTER
Pt last seen on 1/22/2024. Due to return in one year.    Has appt on 1/24/2025.    Rx last filled on 11/4/24 #90/1RF.

## 2025-01-24 ENCOUNTER — OFFICE VISIT (OUTPATIENT)
Age: 45
End: 2025-01-24
Payer: COMMERCIAL

## 2025-01-24 VITALS
DIASTOLIC BLOOD PRESSURE: 84 MMHG | BODY MASS INDEX: 23.72 KG/M2 | WEIGHT: 147.6 LBS | OXYGEN SATURATION: 98 % | HEIGHT: 66 IN | HEART RATE: 89 BPM | RESPIRATION RATE: 16 BRPM | SYSTOLIC BLOOD PRESSURE: 140 MMHG | TEMPERATURE: 98.4 F

## 2025-01-24 DIAGNOSIS — Z00.00 ENCOUNTER FOR WELL ADULT EXAM WITHOUT ABNORMAL FINDINGS: Primary | ICD-10-CM

## 2025-01-24 DIAGNOSIS — Z00.00 ENCOUNTER FOR WELL ADULT EXAM WITHOUT ABNORMAL FINDINGS: ICD-10-CM

## 2025-01-24 DIAGNOSIS — Z23 ENCOUNTER FOR IMMUNIZATION: ICD-10-CM

## 2025-01-24 DIAGNOSIS — E78.5 HYPERLIPIDEMIA, UNSPECIFIED HYPERLIPIDEMIA TYPE: ICD-10-CM

## 2025-01-24 PROCEDURE — 99396 PREV VISIT EST AGE 40-64: CPT | Performed by: STUDENT IN AN ORGANIZED HEALTH CARE EDUCATION/TRAINING PROGRAM

## 2025-01-24 PROCEDURE — 3077F SYST BP >= 140 MM HG: CPT | Performed by: STUDENT IN AN ORGANIZED HEALTH CARE EDUCATION/TRAINING PROGRAM

## 2025-01-24 PROCEDURE — 90471 IMMUNIZATION ADMIN: CPT | Performed by: STUDENT IN AN ORGANIZED HEALTH CARE EDUCATION/TRAINING PROGRAM

## 2025-01-24 PROCEDURE — 90715 TDAP VACCINE 7 YRS/> IM: CPT | Performed by: STUDENT IN AN ORGANIZED HEALTH CARE EDUCATION/TRAINING PROGRAM

## 2025-01-24 PROCEDURE — 3079F DIAST BP 80-89 MM HG: CPT | Performed by: STUDENT IN AN ORGANIZED HEALTH CARE EDUCATION/TRAINING PROGRAM

## 2025-01-24 SDOH — ECONOMIC STABILITY: FOOD INSECURITY: WITHIN THE PAST 12 MONTHS, YOU WORRIED THAT YOUR FOOD WOULD RUN OUT BEFORE YOU GOT MONEY TO BUY MORE.: NEVER TRUE

## 2025-01-24 SDOH — ECONOMIC STABILITY: FOOD INSECURITY: WITHIN THE PAST 12 MONTHS, THE FOOD YOU BOUGHT JUST DIDN'T LAST AND YOU DIDN'T HAVE MONEY TO GET MORE.: NEVER TRUE

## 2025-01-24 ASSESSMENT — PATIENT HEALTH QUESTIONNAIRE - PHQ9
1. LITTLE INTEREST OR PLEASURE IN DOING THINGS: NOT AT ALL
SUM OF ALL RESPONSES TO PHQ QUESTIONS 1-9: 0
SUM OF ALL RESPONSES TO PHQ QUESTIONS 1-9: 0
SUM OF ALL RESPONSES TO PHQ9 QUESTIONS 1 & 2: 0
SUM OF ALL RESPONSES TO PHQ QUESTIONS 1-9: 0
2. FEELING DOWN, DEPRESSED OR HOPELESS: NOT AT ALL
SUM OF ALL RESPONSES TO PHQ QUESTIONS 1-9: 0

## 2025-01-24 ASSESSMENT — ENCOUNTER SYMPTOMS
BLOOD IN STOOL: 0
NAUSEA: 0
DIARRHEA: 0
VOMITING: 0
ABDOMINAL PAIN: 0
CONSTIPATION: 0
SHORTNESS OF BREATH: 0
COUGH: 0

## 2025-01-24 NOTE — PROGRESS NOTES
Chief Complaint   Patient presents with    Annual Exam     eviewed record in preparation for visit and have obtained necessary documentation.    Identified pt with two pt identifiers(name and ).      Health Maintenance Due   Topic    Varicella vaccine (1 of 2 - 13+ 2-dose series)    Hepatitis C screen     DTaP/Tdap/Td vaccine (1 - Tdap)    Cervical cancer screen     Hepatitis B vaccine (2 of 3 - 19+ 3-dose series)    Depression Screen          Chief Complaint   Patient presents with    Annual Exam        Wt Readings from Last 3 Encounters:   25 67 kg (147 lb 9.6 oz)   24 66.2 kg (146 lb)   22 67.2 kg (148 lb 3.2 oz)     Temp Readings from Last 3 Encounters:   25 98.4 °F (36.9 °C) (Temporal)   24 98.3 °F (36.8 °C) (Oral)     BP Readings from Last 3 Encounters:   25 (!) 140/84   24 (!) 163/93   22 (!) 140/80     Pulse Readings from Last 3 Encounters:   25 89   24 88   22 74           Learning Assessment:  :    Failed to redirect to the Timeline version of the [employer] SmartLink.    Depression Screening:  :         No data to display                Fall Risk Assessment:  :    Failed to redirect to the Timeline version of the [employer]FS SmartLink.    Abuse Screening:  :         No data to display

## 2025-01-24 NOTE — PROGRESS NOTES
Chief Complaint   Patient presents with    Annual Exam   eviewed record in preparation for visit and have obtained necessary documentation.    Identified pt with two pt identifiers(name and ).      Health Maintenance Due   Topic    Varicella vaccine (1 of 2 - 13+ 2-dose series)    Hepatitis C screen     Cervical cancer screen     Hepatitis B vaccine (2 of 3 - 19+ 3-dose series)         Chief Complaint   Patient presents with    Annual Exam        Wt Readings from Last 3 Encounters:   25 67 kg (147 lb 9.6 oz)   24 66.2 kg (146 lb)   22 67.2 kg (148 lb 3.2 oz)     Temp Readings from Last 3 Encounters:   25 98.4 °F (36.9 °C) (Temporal)   24 98.3 °F (36.8 °C) (Oral)     BP Readings from Last 3 Encounters:   25 (!) 140/84   24 (!) 163/93   22 (!) 140/80     Pulse Readings from Last 3 Encounters:   25 89   24 88   22 74           Learning Assessment:  :    Failed to redirect to the Timeline version of the [employer] SmartLink.    Depression Screening:  :         No data to display                Fall Risk Assessment:  :    Failed to redirect to the Timeline version of the RE[employer]FS SmartLink.    Abuse Screening:  :         No data to display

## 2025-01-24 NOTE — PATIENT INSTRUCTIONS
hands, brush your teeth twice a day, and wear a seat belt in the car.   Where can you learn more?  Go to https://www.[employer].net/patientEd and enter P072 to learn more about \"Well Visit, Ages 18 to 65: Care Instructions.\"  Current as of: April 30, 2024  Content Version: 14.3  © 2024 [employer].   Care instructions adapted under license by [employer]. If you have questions about a medical condition or this instruction, always ask your healthcare professional. [employer], disclaims any warranty or liability for your use of this information.

## 2025-01-24 NOTE — PROGRESS NOTES
Well Adult Note  Name: Evita Patel Today’s Date: 2025   MRN: [id number] Sex: Female   Age: 44 y.o. Ethnicity: Non- / Non    : 1980 Race: White (non-)      Evita Patel is here for a well adult exam.          Assessment & Plan  Adult general medical exam    2. Elevated blood pressure:   - Home readings average 120/80 mmHg in the mornings, occasionally 136/90 mmHg in the evenings during stress or activity.   - Continue home monitoring  - Maintain healthy lifestyle including regular exercise, stress management  - She will let me know if BP is consistently elevated > 130's/80's    3. Hypercholesterolemia  - Update labs today  - Consider statin therapy if levels remain high    4. Health maintenance:  - Administer tetanus vaccine today  - Cont with GYN for pap/mammogram      RTC in 1 year for CPE or sooner PRN       Subjective   History of Present Illness  The patient presents for annual physical exam. She has no acute complaints today.    Elevated Blood Pressure  She reports an elevated blood pressure reading today, despite recent satisfactory home readings. Typical morning readings are 120/80, increasing to 136/90 in the evening, especially after physical activity. Higher readings were noted during the holiday season due to stress. Last obstetric appointment in August recorded 120/80. Manages stress with lower blood pressure on weekends. Increasing physical activity with Pilates, yoga, and meditation. Reports a stressful period at work and extensive travel.     Hypercholesterolemia  Concerned about cholesterol levels, elevated last year. Long-standing history of high cholesterol since pediatric years. Made dietary modifications, reducing red meat and saturated fats.    Received last tetanus vaccine 10 years ago when her son was born      Review of Systems   Constitutional:  Negative for chills and fever.   Respiratory:  Negative for cough and shortness of breath.    Cardiovascular:

## 2025-01-31 LAB
ALBUMIN SERPL-MCNC: 4.1 G/DL (ref 3.9–4.9)
ALP SERPL-CCNC: 41 IU/L (ref 44–121)
ALT SERPL-CCNC: 16 IU/L (ref 0–32)
AST SERPL-CCNC: 15 IU/L (ref 0–40)
BILIRUB SERPL-MCNC: 0.5 MG/DL (ref 0–1.2)
BUN SERPL-MCNC: 14 MG/DL (ref 6–24)
BUN/CREAT SERPL: 16 (ref 9–23)
CALCIUM SERPL-MCNC: 9.4 MG/DL (ref 8.7–10.2)
CHLORIDE SERPL-SCNC: 103 MMOL/L (ref 96–106)
CHOLEST SERPL-MCNC: 242 MG/DL (ref 100–199)
CO2 SERPL-SCNC: 23 MMOL/L (ref 20–29)
CREAT SERPL-MCNC: 0.87 MG/DL (ref 0.57–1)
EGFRCR SERPLBLD CKD-EPI 2021: 85 ML/MIN/1.73
ERYTHROCYTE [DISTWIDTH] IN BLOOD BY AUTOMATED COUNT: 12.2 % (ref 11.7–15.4)
GLOBULIN SER CALC-MCNC: 3.1 G/DL (ref 1.5–4.5)
GLUCOSE SERPL-MCNC: 98 MG/DL (ref 70–99)
HCT VFR BLD AUTO: 38.8 % (ref 34–46.6)
HDLC SERPL-MCNC: 80 MG/DL
HGB BLD-MCNC: 12.8 G/DL (ref 11.1–15.9)
LDLC SERPL CALC-MCNC: 138 MG/DL (ref 0–99)
MCH RBC QN AUTO: 30.6 PG (ref 26.6–33)
MCHC RBC AUTO-ENTMCNC: 33 G/DL (ref 31.5–35.7)
MCV RBC AUTO: 93 FL (ref 79–97)
PLATELET # BLD AUTO: 449 X10E3/UL (ref 150–450)
POTASSIUM SERPL-SCNC: 3.9 MMOL/L (ref 3.5–5.2)
PROT SERPL-MCNC: 7.2 G/DL (ref 6–8.5)
RBC # BLD AUTO: 4.18 X10E6/UL (ref 3.77–5.28)
SODIUM SERPL-SCNC: 140 MMOL/L (ref 134–144)
TRIGL SERPL-MCNC: 140 MG/DL (ref 0–149)
VLDLC SERPL CALC-MCNC: 24 MG/DL (ref 5–40)
WBC # BLD AUTO: 6.8 X10E3/UL (ref 3.4–10.8)

## 2025-03-16 DIAGNOSIS — B00.1 RECURRENT COLD SORES: ICD-10-CM

## 2025-03-17 RX ORDER — VALACYCLOVIR HYDROCHLORIDE 1 G/1
1000 TABLET, FILM COATED ORAL 2 TIMES DAILY PRN
Qty: 30 TABLET | Refills: 1 | Status: SHIPPED | OUTPATIENT
Start: 2025-03-17

## 2025-03-17 NOTE — TELEPHONE ENCOUNTER
Rx sent to pharmacy as previously filled and verified by Verbal Order Read Back with provider.    NV 01/27/2026

## 2025-03-30 DIAGNOSIS — B00.1 RECURRENT COLD SORES: ICD-10-CM

## 2025-03-31 RX ORDER — VALACYCLOVIR HYDROCHLORIDE 1 G/1
1000 TABLET, FILM COATED ORAL 2 TIMES DAILY PRN
Qty: 180 TABLET | Refills: 1 | Status: SHIPPED | OUTPATIENT
Start: 2025-03-31

## 2025-03-31 NOTE — TELEPHONE ENCOUNTER
Last office visit 1/24/25    Next Appt  With Internal Medicine (Natanael Haywood DO)  01/27/2026 at 8:00 AM      Last fill on valtrex 3/17/25 #30 with 1 additional refill

## 2025-07-16 DIAGNOSIS — R03.0 ELEVATED BLOOD-PRESSURE READING, WITHOUT DIAGNOSIS OF HYPERTENSION: ICD-10-CM

## 2025-07-16 RX ORDER — AMLODIPINE BESYLATE 5 MG/1
5 TABLET ORAL DAILY
Qty: 90 TABLET | Refills: 1 | Status: SHIPPED | OUTPATIENT
Start: 2025-07-16